# Patient Record
Sex: MALE | Race: WHITE | HISPANIC OR LATINO | URBAN - METROPOLITAN AREA
[De-identification: names, ages, dates, MRNs, and addresses within clinical notes are randomized per-mention and may not be internally consistent; named-entity substitution may affect disease eponyms.]

---

## 2018-04-09 ENCOUNTER — OUTPATIENT (OUTPATIENT)
Dept: OUTPATIENT SERVICES | Facility: HOSPITAL | Age: 58
LOS: 1 days | End: 2018-04-09
Payer: MEDICARE

## 2018-04-09 ENCOUNTER — APPOINTMENT (OUTPATIENT)
Dept: PULMONOLOGY | Facility: CLINIC | Age: 58
End: 2018-04-09
Payer: MEDICARE

## 2018-04-09 PROCEDURE — 99386 PREV VISIT NEW AGE 40-64: CPT | Mod: 25

## 2018-04-09 PROCEDURE — 71046 X-RAY EXAM CHEST 2 VIEWS: CPT | Mod: 26

## 2018-04-09 PROCEDURE — 71046 X-RAY EXAM CHEST 2 VIEWS: CPT

## 2018-04-09 PROCEDURE — 36415 COLL VENOUS BLD VENIPUNCTURE: CPT

## 2018-04-09 PROCEDURE — 93000 ELECTROCARDIOGRAM COMPLETE: CPT

## 2018-04-13 ENCOUNTER — APPOINTMENT (OUTPATIENT)
Dept: ULTRASOUND IMAGING | Facility: HOSPITAL | Age: 58
End: 2018-04-13
Payer: MEDICARE

## 2018-04-13 ENCOUNTER — OUTPATIENT (OUTPATIENT)
Dept: OUTPATIENT SERVICES | Facility: HOSPITAL | Age: 58
LOS: 1 days | End: 2018-04-13
Payer: MEDICARE

## 2018-04-13 PROCEDURE — 93880 EXTRACRANIAL BILAT STUDY: CPT

## 2018-04-13 PROCEDURE — 93880 EXTRACRANIAL BILAT STUDY: CPT | Mod: 26

## 2018-04-17 ENCOUNTER — OUTPATIENT (OUTPATIENT)
Dept: OUTPATIENT SERVICES | Facility: HOSPITAL | Age: 58
LOS: 1 days | End: 2018-04-17
Payer: MEDICARE

## 2018-04-17 DIAGNOSIS — R07.9 CHEST PAIN, UNSPECIFIED: ICD-10-CM

## 2018-04-17 DIAGNOSIS — I25.9 CHRONIC ISCHEMIC HEART DISEASE, UNSPECIFIED: ICD-10-CM

## 2018-04-17 DIAGNOSIS — R06.02 SHORTNESS OF BREATH: ICD-10-CM

## 2018-04-17 PROCEDURE — 93306 TTE W/DOPPLER COMPLETE: CPT | Mod: 26

## 2018-04-17 PROCEDURE — 93306 TTE W/DOPPLER COMPLETE: CPT

## 2018-04-18 ENCOUNTER — APPOINTMENT (OUTPATIENT)
Dept: SLEEP CENTER | Facility: HOSPITAL | Age: 58
End: 2018-04-18

## 2018-04-18 ENCOUNTER — OUTPATIENT (OUTPATIENT)
Dept: OUTPATIENT SERVICES | Facility: HOSPITAL | Age: 58
LOS: 1 days | End: 2018-04-18
Payer: MEDICARE

## 2018-04-18 DIAGNOSIS — G47.33 OBSTRUCTIVE SLEEP APNEA (ADULT) (PEDIATRIC): ICD-10-CM

## 2018-04-18 PROCEDURE — 95811 POLYSOM 6/>YRS CPAP 4/> PARM: CPT | Mod: 26

## 2018-04-18 PROCEDURE — 95811 POLYSOM 6/>YRS CPAP 4/> PARM: CPT

## 2018-04-21 ENCOUNTER — OUTPATIENT (OUTPATIENT)
Dept: OUTPATIENT SERVICES | Facility: HOSPITAL | Age: 58
LOS: 1 days | End: 2018-04-21
Payer: MEDICARE

## 2018-04-21 ENCOUNTER — APPOINTMENT (OUTPATIENT)
Dept: CT IMAGING | Facility: HOSPITAL | Age: 58
End: 2018-04-21
Payer: MEDICARE

## 2018-04-21 PROCEDURE — 71250 CT THORAX DX C-: CPT

## 2018-04-21 PROCEDURE — 71250 CT THORAX DX C-: CPT | Mod: 26

## 2018-05-02 ENCOUNTER — APPOINTMENT (OUTPATIENT)
Dept: HEART AND VASCULAR | Facility: CLINIC | Age: 58
End: 2018-05-02
Payer: MEDICARE

## 2018-05-02 VITALS
BODY MASS INDEX: 31.83 KG/M2 | WEIGHT: 173 LBS | DIASTOLIC BLOOD PRESSURE: 80 MMHG | SYSTOLIC BLOOD PRESSURE: 124 MMHG | HEART RATE: 68 BPM | HEIGHT: 62 IN

## 2018-05-02 DIAGNOSIS — Z82.3 FAMILY HISTORY OF STROKE: ICD-10-CM

## 2018-05-02 PROCEDURE — 93306 TTE W/DOPPLER COMPLETE: CPT

## 2018-05-02 PROCEDURE — 36415 COLL VENOUS BLD VENIPUNCTURE: CPT

## 2018-05-02 PROCEDURE — 93000 ELECTROCARDIOGRAM COMPLETE: CPT

## 2018-05-02 PROCEDURE — 99204 OFFICE O/P NEW MOD 45 MIN: CPT | Mod: 25

## 2018-05-03 ENCOUNTER — FORM ENCOUNTER (OUTPATIENT)
Age: 58
End: 2018-05-03

## 2018-05-03 LAB
ALBUMIN SERPL ELPH-MCNC: 4.5 G/DL
ALP BLD-CCNC: 75 U/L
ALT SERPL-CCNC: 33 U/L
ANION GAP SERPL CALC-SCNC: 15 MMOL/L
AST SERPL-CCNC: 22 U/L
BILIRUB SERPL-MCNC: 0.5 MG/DL
BUN SERPL-MCNC: 15 MG/DL
CALCIUM SERPL-MCNC: 10 MG/DL
CHLORIDE SERPL-SCNC: 98 MMOL/L
CHOLEST SERPL-MCNC: 183 MG/DL
CHOLEST/HDLC SERPL: 4.6 RATIO
CO2 SERPL-SCNC: 28 MMOL/L
CREAT SERPL-MCNC: 1.06 MG/DL
GLUCOSE SERPL-MCNC: 89 MG/DL
HDLC SERPL-MCNC: 40 MG/DL
LDLC SERPL CALC-MCNC: 102 MG/DL
POTASSIUM SERPL-SCNC: 3.8 MMOL/L
PROT SERPL-MCNC: 7.4 G/DL
SODIUM SERPL-SCNC: 141 MMOL/L
TRIGL SERPL-MCNC: 204 MG/DL

## 2018-05-09 ENCOUNTER — RESULT REVIEW (OUTPATIENT)
Age: 58
End: 2018-05-09

## 2018-05-15 ENCOUNTER — APPOINTMENT (OUTPATIENT)
Dept: HEART AND VASCULAR | Facility: CLINIC | Age: 58
End: 2018-05-15
Payer: MEDICARE

## 2018-05-15 VITALS — HEIGHT: 62 IN | BODY MASS INDEX: 31.83 KG/M2 | WEIGHT: 173 LBS

## 2018-05-15 PROCEDURE — A9500: CPT

## 2018-05-15 PROCEDURE — 93015 CV STRESS TEST SUPVJ I&R: CPT

## 2018-05-15 PROCEDURE — 78452 HT MUSCLE IMAGE SPECT MULT: CPT

## 2018-07-02 ENCOUNTER — APPOINTMENT (OUTPATIENT)
Dept: PULMONOLOGY | Facility: CLINIC | Age: 58
End: 2018-07-02
Payer: MEDICARE

## 2018-07-02 VITALS
BODY MASS INDEX: 32.09 KG/M2 | TEMPERATURE: 98 F | HEART RATE: 84 BPM | WEIGHT: 174.4 LBS | OXYGEN SATURATION: 98 % | DIASTOLIC BLOOD PRESSURE: 70 MMHG | HEIGHT: 62 IN | SYSTOLIC BLOOD PRESSURE: 130 MMHG

## 2018-07-02 PROCEDURE — 99214 OFFICE O/P EST MOD 30 MIN: CPT

## 2018-07-18 ENCOUNTER — APPOINTMENT (OUTPATIENT)
Dept: HEART AND VASCULAR | Facility: CLINIC | Age: 58
End: 2018-07-18

## 2018-10-02 ENCOUNTER — APPOINTMENT (OUTPATIENT)
Dept: PULMONOLOGY | Facility: CLINIC | Age: 58
End: 2018-10-02
Payer: MEDICARE

## 2018-10-02 VITALS
SYSTOLIC BLOOD PRESSURE: 130 MMHG | WEIGHT: 175 LBS | BODY MASS INDEX: 32.2 KG/M2 | TEMPERATURE: 98.5 F | HEART RATE: 80 BPM | OXYGEN SATURATION: 98 % | HEIGHT: 62 IN | DIASTOLIC BLOOD PRESSURE: 90 MMHG

## 2018-10-02 DIAGNOSIS — Z23 ENCOUNTER FOR IMMUNIZATION: ICD-10-CM

## 2018-10-02 DIAGNOSIS — G56.00 CARPAL TUNNEL SYNDROME, UNSPECIFIED UPPER LIMB: ICD-10-CM

## 2018-10-02 PROCEDURE — G0008: CPT

## 2018-10-02 PROCEDURE — 99214 OFFICE O/P EST MOD 30 MIN: CPT | Mod: 25

## 2018-10-02 PROCEDURE — 36415 COLL VENOUS BLD VENIPUNCTURE: CPT

## 2018-10-02 PROCEDURE — 90662 IIV NO PRSV INCREASED AG IM: CPT

## 2018-10-02 NOTE — ASSESSMENT
[FreeTextEntry1] : Dyspnea\par \par My concern at this could be related to underlying obstructive lung disease versus cardiac in etiology. The previous CT scan of the chest was unremarkable and also the echocardiogram was consistent with diastolic dysfunction. Patient has an appointment with Dr. Castillo this month. I will follow PFT and further recommendation will follow.\par \par Obstructive sleep apnea\par \par The patient is compliant with the machine and he had to fix a part of the component. Hyperlipidemia\par \par Patient is on statin and followup on the lab\par \par Hypertension\par \par The blood pressure is 120/80 I retook the blood pressure. He is to continue on current meds at night\par \par Inner thigh dysfunction\par \par Patient is on sildenafil as needed\par \par Coronary artery disease and aortic insufficiency\par \par Patient is to followup with Dr. Castillo and he is to continue on the current regimen. Carpal tunnel syndrome\par \par I instructed the patient to follow with orthopedics as they recommended injection and he will follow with them.\par \par Immunization\par \par 0.5 mL of fluid seen was injected into the right it to 8 which tolerated well.

## 2018-10-02 NOTE — HISTORY OF PRESENT ILLNESS
[FreeTextEntry1] : he is sob and coughing [de-identified] : He started feeling sob for some time when he goes up stairs and up a hill but he is going for walks he is OK as long as he is slow.  The cough started again and sounds like asthma.  The cough comes spontaneously when he feels hot.  he is sleeping with the mask.  He has acid reflux. No ruuny nose.  Both hands are numb.  His back hurts

## 2018-10-04 LAB
ALBUMIN SERPL ELPH-MCNC: 4.9 G/DL
ALP BLD-CCNC: 51 U/L
ALT SERPL-CCNC: 20 U/L
ANION GAP SERPL CALC-SCNC: 12 MMOL/L
AST SERPL-CCNC: 19 U/L
BASOPHILS # BLD AUTO: 0.02 K/UL
BASOPHILS NFR BLD AUTO: 0.4 %
BILIRUB SERPL-MCNC: 0.4 MG/DL
BUN SERPL-MCNC: 18 MG/DL
CALCIUM SERPL-MCNC: 10.1 MG/DL
CHLORIDE SERPL-SCNC: 104 MMOL/L
CHOLEST SERPL-MCNC: 181 MG/DL
CHOLEST/HDLC SERPL: 4.6 RATIO
CO2 SERPL-SCNC: 24 MMOL/L
CREAT SERPL-MCNC: 0.91 MG/DL
EOSINOPHIL # BLD AUTO: 0.11 K/UL
EOSINOPHIL NFR BLD AUTO: 2 %
GLUCOSE SERPL-MCNC: 90 MG/DL
HBA1C MFR BLD HPLC: 6.3 %
HCT VFR BLD CALC: 41.5 %
HDLC SERPL-MCNC: 39 MG/DL
HGB BLD-MCNC: 14.3 G/DL
IMM GRANULOCYTES NFR BLD AUTO: 0.2 %
LDLC SERPL CALC-MCNC: 115 MG/DL
LYMPHOCYTES # BLD AUTO: 1.39 K/UL
LYMPHOCYTES NFR BLD AUTO: 25.4 %
MAN DIFF?: NORMAL
MCHC RBC-ENTMCNC: 28.8 PG
MCHC RBC-ENTMCNC: 34.5 GM/DL
MCV RBC AUTO: 83.7 FL
MONOCYTES # BLD AUTO: 0.63 K/UL
MONOCYTES NFR BLD AUTO: 11.5 %
NEUTROPHILS # BLD AUTO: 3.32 K/UL
NEUTROPHILS NFR BLD AUTO: 60.5 %
PLATELET # BLD AUTO: 269 K/UL
POTASSIUM SERPL-SCNC: 3.9 MMOL/L
PROT SERPL-MCNC: 7.4 G/DL
RBC # BLD: 4.96 M/UL
RBC # FLD: 14.5 %
SODIUM SERPL-SCNC: 140 MMOL/L
T3 SERPL-MCNC: 122 NG/DL
T4 SERPL-MCNC: 6.9 UG/DL
TRIGL SERPL-MCNC: 137 MG/DL
TSH SERPL-ACNC: 0.7 UIU/ML
WBC # FLD AUTO: 5.48 K/UL

## 2018-10-16 ENCOUNTER — NON-APPOINTMENT (OUTPATIENT)
Age: 58
End: 2018-10-16

## 2018-10-16 ENCOUNTER — APPOINTMENT (OUTPATIENT)
Dept: HEART AND VASCULAR | Facility: CLINIC | Age: 58
End: 2018-10-16
Payer: MEDICARE

## 2018-10-16 VITALS
HEIGHT: 62 IN | DIASTOLIC BLOOD PRESSURE: 90 MMHG | WEIGHT: 175 LBS | BODY MASS INDEX: 32.2 KG/M2 | SYSTOLIC BLOOD PRESSURE: 138 MMHG

## 2018-10-16 VITALS — DIASTOLIC BLOOD PRESSURE: 90 MMHG | SYSTOLIC BLOOD PRESSURE: 150 MMHG

## 2018-10-16 VITALS — HEART RATE: 75 BPM

## 2018-10-16 PROCEDURE — 93000 ELECTROCARDIOGRAM COMPLETE: CPT

## 2018-10-16 PROCEDURE — 99214 OFFICE O/P EST MOD 30 MIN: CPT | Mod: 25

## 2018-10-16 RX ORDER — CHLORTHALIDONE 25 MG/1
25 TABLET ORAL
Refills: 0 | Status: DISCONTINUED | COMMUNITY
End: 2018-10-16

## 2018-10-16 RX ORDER — AMLODIPINE BESYLATE 10 MG/1
10 TABLET ORAL
Refills: 0 | Status: DISCONTINUED | COMMUNITY
End: 2018-10-16

## 2018-10-17 LAB — NT-PROBNP SERPL-MCNC: 92 PG/ML

## 2018-11-13 ENCOUNTER — APPOINTMENT (OUTPATIENT)
Dept: HEART AND VASCULAR | Facility: CLINIC | Age: 58
End: 2018-11-13

## 2018-11-16 ENCOUNTER — APPOINTMENT (OUTPATIENT)
Dept: PULMONOLOGY | Facility: CLINIC | Age: 58
End: 2018-11-16

## 2018-11-20 ENCOUNTER — RX RENEWAL (OUTPATIENT)
Age: 58
End: 2018-11-20

## 2018-11-27 ENCOUNTER — APPOINTMENT (OUTPATIENT)
Dept: PULMONOLOGY | Facility: CLINIC | Age: 58
End: 2018-11-27
Payer: MEDICARE

## 2018-12-05 ENCOUNTER — APPOINTMENT (OUTPATIENT)
Dept: PULMONOLOGY | Facility: CLINIC | Age: 58
End: 2018-12-05

## 2018-12-14 ENCOUNTER — APPOINTMENT (OUTPATIENT)
Dept: PULMONOLOGY | Facility: CLINIC | Age: 58
End: 2018-12-14
Payer: MEDICARE

## 2018-12-14 PROCEDURE — 94729 DIFFUSING CAPACITY: CPT

## 2018-12-14 PROCEDURE — 94060 EVALUATION OF WHEEZING: CPT

## 2019-02-15 RX ORDER — ASPIRIN 81 MG/1
81 TABLET ORAL DAILY
Qty: 30 | Refills: 0 | Status: ACTIVE | COMMUNITY
Start: 1900-01-01 | End: 1900-01-01

## 2019-02-25 ENCOUNTER — APPOINTMENT (OUTPATIENT)
Dept: HEART AND VASCULAR | Facility: CLINIC | Age: 59
End: 2019-02-25

## 2019-04-10 ENCOUNTER — APPOINTMENT (OUTPATIENT)
Dept: PULMONOLOGY | Facility: CLINIC | Age: 59
End: 2019-04-10

## 2019-08-29 ENCOUNTER — APPOINTMENT (OUTPATIENT)
Dept: PULMONOLOGY | Facility: CLINIC | Age: 59
End: 2019-08-29
Payer: MEDICARE

## 2019-08-29 VITALS
OXYGEN SATURATION: 100 % | HEIGHT: 62 IN | BODY MASS INDEX: 33.13 KG/M2 | WEIGHT: 180 LBS | DIASTOLIC BLOOD PRESSURE: 104 MMHG | TEMPERATURE: 98.7 F | RESPIRATION RATE: 12 BRPM | HEART RATE: 84 BPM | SYSTOLIC BLOOD PRESSURE: 176 MMHG

## 2019-08-29 PROCEDURE — 94727 GAS DIL/WSHOT DETER LNG VOL: CPT

## 2019-08-29 PROCEDURE — 94060 EVALUATION OF WHEEZING: CPT

## 2019-08-29 PROCEDURE — 99203 OFFICE O/P NEW LOW 30 MIN: CPT | Mod: 25

## 2019-08-29 PROCEDURE — 94729 DIFFUSING CAPACITY: CPT

## 2019-08-29 NOTE — DISCUSSION/SUMMARY
[FreeTextEntry1] : 58 yo male with complaints consistent with airway inflammation. Montelukast 10 mg daily prescribed with PRN albuterol MDI. Treatment adjustment will depend on symptomatic needs. The etiology of the mild restriction is likely due to his prior CABG. PFT will be repeated in the future.\par Treatment for JANES was stressed. CPAP will be ordered. Diet and and weight loss discussed. He is to avoid sedative/hypnotic meds and excessive alcohol use.

## 2019-08-29 NOTE — HISTORY OF PRESENT ILLNESS
[FreeTextEntry1] : 58 yo male with hx of chronic PRN productive cough presents for evaluation. The patient denies fever, chest pain or hemoptysis. He has been treated in the past with albuterol MDI which he uses PRN. He has grass allergies.The patient also has a hx of JANES, but no longer uses CPAP because of insurance, which has now changed. He complains of daytime somnolence and fatigue.

## 2019-08-29 NOTE — REVIEW OF SYSTEMS
[As Noted in HPI] : as noted in HPI [Cough] : cough [Sputum] : sputum  [Hypertension] : ~T hypertension [Negative] : Pulmonary Hypertension [Fatigue] : fatigue [Snoring] : snoring [Hypersomnolence] : sleeping much more than usual

## 2019-08-29 NOTE — PHYSICAL EXAM
[General Appearance - Well Developed] : well developed [Normal Appearance] : normal appearance [Well Groomed] : well groomed [General Appearance - Well Nourished] : well nourished [No Deformities] : no deformities [General Appearance - In No Acute Distress] : no acute distress [Normal Conjunctiva] : the conjunctiva exhibited no abnormalities [Eyelids - No Xanthelasma] : the eyelids demonstrated no xanthelasmas [Normal Oropharynx] : normal oropharynx [Neck Appearance] : the appearance of the neck was normal [Neck Cervical Mass (___cm)] : no neck mass was observed [Jugular Venous Distention Increased] : there was no jugular-venous distention [Thyroid Diffuse Enlargement] : the thyroid was not enlarged [Thyroid Nodule] : there were no palpable thyroid nodules [Heart Rate And Rhythm] : heart rate and rhythm were normal [Heart Sounds] : normal S1 and S2 [Murmurs] : no murmurs present [Respiration, Rhythm And Depth] : normal respiratory rhythm and effort [Exaggerated Use Of Accessory Muscles For Inspiration] : no accessory muscle use [Auscultation Breath Sounds / Voice Sounds] : lungs were clear to auscultation bilaterally [Abdomen Soft] : soft [Abdomen Tenderness] : non-tender [Abdomen Mass (___ Cm)] : no abdominal mass palpated [Cyanosis, Localized] : no localized cyanosis [Nail Clubbing] : no clubbing of the fingernails [Petechial Hemorrhages (___cm)] : no petechial hemorrhages [Skin Color & Pigmentation] : normal skin color and pigmentation [Skin Turgor] : normal skin turgor [] : no rash [No Focal Deficits] : no focal deficits [Oriented To Time, Place, And Person] : oriented to person, place, and time [Impaired Insight] : insight and judgment were intact [Affect] : the affect was normal

## 2019-10-09 ENCOUNTER — APPOINTMENT (OUTPATIENT)
Dept: PULMONOLOGY | Facility: CLINIC | Age: 59
End: 2019-10-09

## 2019-11-21 ENCOUNTER — APPOINTMENT (OUTPATIENT)
Dept: PULMONOLOGY | Facility: CLINIC | Age: 59
End: 2019-11-21
Payer: MEDICARE

## 2019-11-21 VITALS
OXYGEN SATURATION: 99 % | WEIGHT: 175 LBS | DIASTOLIC BLOOD PRESSURE: 102 MMHG | HEART RATE: 80 BPM | BODY MASS INDEX: 32.01 KG/M2 | SYSTOLIC BLOOD PRESSURE: 176 MMHG

## 2019-11-21 PROCEDURE — 90686 IIV4 VACC NO PRSV 0.5 ML IM: CPT

## 2019-11-21 PROCEDURE — G0008: CPT

## 2019-11-21 PROCEDURE — 99214 OFFICE O/P EST MOD 30 MIN: CPT | Mod: 25

## 2019-11-29 NOTE — REVIEW OF SYSTEMS
[Fatigue] : fatigue [As Noted in HPI] : as noted in HPI [Sputum] : not coughing up ~M sputum [Dyspnea] : no dyspnea [Cough] : no cough [Hypertension] : ~T hypertension [Hypersomnolence] : not sleeping much more than usual [Snoring] : no snoring [Negative] : Pulmonary Hypertension

## 2019-11-29 NOTE — PHYSICAL EXAM
[General Appearance - Well Developed] : well developed [Normal Appearance] : normal appearance [Well Groomed] : well groomed [No Deformities] : no deformities [General Appearance - In No Acute Distress] : no acute distress [General Appearance - Well Nourished] : well nourished [Normal Oropharynx] : normal oropharynx [Normal Conjunctiva] : the conjunctiva exhibited no abnormalities [Eyelids - No Xanthelasma] : the eyelids demonstrated no xanthelasmas [Neck Cervical Mass (___cm)] : no neck mass was observed [Neck Appearance] : the appearance of the neck was normal [Thyroid Diffuse Enlargement] : the thyroid was not enlarged [Heart Rate And Rhythm] : heart rate and rhythm were normal [Thyroid Nodule] : there were no palpable thyroid nodules [Jugular Venous Distention Increased] : there was no jugular-venous distention [FreeTextEntry1] : Mid sternotomy scar [Heart Sounds] : normal S1 and S2 [Murmurs] : no murmurs present [Exaggerated Use Of Accessory Muscles For Inspiration] : no accessory muscle use [Respiration, Rhythm And Depth] : normal respiratory rhythm and effort [Auscultation Breath Sounds / Voice Sounds] : lungs were clear to auscultation bilaterally [Abdomen Soft] : soft [Abdomen Tenderness] : non-tender [Nail Clubbing] : no clubbing of the fingernails [Abdomen Mass (___ Cm)] : no abdominal mass palpated [Petechial Hemorrhages (___cm)] : no petechial hemorrhages [Cyanosis, Localized] : no localized cyanosis [] : no rash [No Venous Stasis] : no venous stasis [Skin Color & Pigmentation] : normal skin color and pigmentation [No Xanthoma] : no  xanthoma was observed [No Skin Ulcers] : no skin ulcer [Skin Lesions] : no skin lesions [Impaired Insight] : insight and judgment were intact [Oriented To Time, Place, And Person] : oriented to person, place, and time [Affect] : the affect was normal [No Focal Deficits] : no focal deficits

## 2019-11-29 NOTE — HISTORY OF PRESENT ILLNESS
[FreeTextEntry1] : 58 yo male with hx of JANES and OAD presents for follow up. The patient is compliant with CPAP use. He complains of PRN fatigue but denies other sleep related complaints.He uses montelukast daily with PRN albuterol MDI. He denies cough, chest pain or hemoptysis.

## 2019-11-29 NOTE — DISCUSSION/SUMMARY
[FreeTextEntry1] : 58 yo male with stable pulmonary exam. He is to use montelukast daily as before with PRN albuterol MDI. Treatment adjustment will depend on symptomatic needs.PFT will be performed next visit. Continued compliance with CPAP use stressed. The influenza vaccine was administered.

## 2020-02-27 ENCOUNTER — APPOINTMENT (OUTPATIENT)
Dept: PULMONOLOGY | Facility: CLINIC | Age: 60
End: 2020-02-27

## 2020-04-16 ENCOUNTER — APPOINTMENT (OUTPATIENT)
Dept: PULMONOLOGY | Facility: CLINIC | Age: 60
End: 2020-04-16

## 2020-04-24 ENCOUNTER — TRANSCRIPTION ENCOUNTER (OUTPATIENT)
Age: 60
End: 2020-04-24

## 2020-04-24 ENCOUNTER — APPOINTMENT (OUTPATIENT)
Dept: PULMONOLOGY | Facility: CLINIC | Age: 60
End: 2020-04-24
Payer: MEDICARE

## 2020-04-24 PROCEDURE — 99214 OFFICE O/P EST MOD 30 MIN: CPT | Mod: 95

## 2020-04-24 RX ORDER — RANITIDINE 150 MG/1
150 TABLET ORAL
Qty: 90 | Refills: 3 | Status: DISCONTINUED | COMMUNITY
Start: 2018-05-08 | End: 2020-04-24

## 2020-04-26 ENCOUNTER — APPOINTMENT (OUTPATIENT)
Dept: HEART AND VASCULAR | Facility: CLINIC | Age: 60
End: 2020-04-26

## 2020-04-27 ENCOUNTER — APPOINTMENT (OUTPATIENT)
Dept: HEART AND VASCULAR | Facility: CLINIC | Age: 60
End: 2020-04-27
Payer: MEDICARE

## 2020-04-27 ENCOUNTER — APPOINTMENT (OUTPATIENT)
Dept: HEART AND VASCULAR | Facility: CLINIC | Age: 60
End: 2020-04-27

## 2020-04-27 VITALS — WEIGHT: 185 LBS | BODY MASS INDEX: 33.84 KG/M2

## 2020-04-27 VITALS — HEART RATE: 88 BPM

## 2020-04-27 DIAGNOSIS — Z95.0 PRESENCE OF CARDIAC PACEMAKER: ICD-10-CM

## 2020-04-27 PROCEDURE — 99214 OFFICE O/P EST MOD 30 MIN: CPT | Mod: 95

## 2020-04-27 RX ORDER — EZETIMIBE 10 MG/1
10 TABLET ORAL
Qty: 90 | Refills: 2 | Status: DISCONTINUED | COMMUNITY
End: 2020-04-27

## 2020-04-27 RX ORDER — VALSARTAN AND HYDROCHLOROTHIAZIDE 320; 25 MG/1; MG/1
320-25 TABLET, FILM COATED ORAL DAILY
Qty: 30 | Refills: 1 | Status: DISCONTINUED | COMMUNITY
Start: 1900-01-01 | End: 2020-04-27

## 2020-04-27 RX ORDER — EPLERENONE 50 MG/1
50 TABLET, COATED ORAL DAILY
Qty: 1 | Refills: 0 | Status: DISCONTINUED | COMMUNITY
End: 2020-04-27

## 2020-04-27 RX ORDER — INFLUENZA A VIRUS A/CALIFORNIA/7/2009 X-179A (H1N1) ANTIGEN (FORMALDEHYDE INACTIVATED), INFLUENZA A VIRUS A/HONG KONG/4801/2014 X-263B (H3N2) ANTIGEN (FORMALDEHYDE INACTIVATED), AND INFLUENZA B VIRUS B/BRISBANE/60/2008 ANTIGEN (FORMALDEHYDE INACTIVATED) 60; 60; 60 UG/.5ML; UG/.5ML; UG/.5ML
INJECTION, SUSPENSION INTRAMUSCULAR
Qty: 1 | Refills: 0 | Status: DISCONTINUED | COMMUNITY
Start: 2018-10-02 | End: 2020-04-27

## 2020-04-27 NOTE — HISTORY OF PRESENT ILLNESS
[Feelings Of Weakness On Exertion] : stable exercise intolerance [Difficulty Breathing During Exertion] : stable dyspnea on exertion [Regional Soft Tissue Swelling Both Lower Extremities] : improved lower extremity edema [Wheezing] : improved wheezing [Cough] : stable coughing [Chest Pain Or Discomfort] : improved chest pain [Fever] : improved fever [5  -  Severe] : 5, severe [Date: ___] : was performed [unfilled] [None] : None [Obstructive Sleep Apnea] : obstructive sleep apnea [Stable] : are stable [TextBox_4] : 60 y with PMH of JANES with nasal mask (compliant with CPAP), Pacemaker ([facility] following).  Pt presents today for a televideo visit.\par \par He has complaints of intermittent cough.  He has SOB with rushing.  He walks his dog several time a day with dog.  denies fever, wheezing, baronial pain, N& V, chest pain. \par \par BP 140s over 90.  Pt BP taking during apt with home machine 162/123 and error message. Denies headaches or dizziness.  He has a wrist machine and states it has been acting up.  He has not yet taken his BP medications this morning.  He has SOB and has been more complaint with the CPAP mask.\par \par Pt is followed by pain management for back and knee pain.  He was given tramadol 50 mg  as needed.  He is following Dr. osuna. [Wt Gain ___ Lbs] : no recent weight gain [Wt Loss ___ Lbs] : no recent weight loss [Oxygen] : the patient uses no supplemental oxygen [Snoring] : no snoring [Witnessed Apneas] : no witnessed sleep apnea [Frequent Nocturnal Awakening] : no nocturnal awakening [Daytime Somnolence] : no daytime somnolence [Unintentional Sleep while Active] : no unintentional sleep while active [Awakes Unrefreshed] : does not awake unrefreshed [Awakes with Headache] : no headache upon awakening [Awakening With Dry Mouth] : no dry mouth upon awakening

## 2020-04-27 NOTE — ASSESSMENT
[FreeTextEntry1] : The patient was compliant with the CPAP mask.  He has a AHI 34.6. He tolerated the CPAP mask and compliant with it. \par \par Coronary disease - follow with labs during next visit in office\par \par Hypertension - Pt instructed to take BP medications and follow BP.  He is instructed to get a new home cuff due to error messages.  He will follow with Dr. Castillo\par \par Hyperlipidemia - follow lab work next visit in office \par \par Restrictive lung disease - Patient is clinically stable.\par \par Health Maintenance:   He is to increase her exercise capacity. I renewed his mediations. \par I prescribed Cialis for his erectile dysfunction.  It was previously cleared with Dr. Pan.\par Renewed meds and will have telehealth visit with Dr. Castillo

## 2020-04-27 NOTE — DISCUSSION/SUMMARY
[FreeTextEntry1] : BP elevated as per pt( states HR in 80's)\par  restart toprol (for CAD & HPTN) and continue Diovan + norvasc for HPTN; f/u with EP for ppm , cont Lipitor for lipids; he is still on Plavix- will get f/u nuc stress and if no ischemia feel can DC Plavix and cont asa. F/u with pulmonary for JANES. RV 1-2 weeks for BP check and echo for MONK( chronic) I feel MONK due to weight/pulmonary_ no sig ischemia on prior nuc stress\par will also draw labs. If BP still high would change HCTZ to chlorthalidone\par Telehealth visit done on [employer]- multiple attempts yesterday on [employer] and technical issues.\par Time spent included review of charta(last seen Oct 2018) and attempts today and yesterday

## 2020-04-27 NOTE — REVIEW OF SYSTEMS
[Fever] : no fever [Headache] : no headache [Chills] : no chills [Recent Weight Gain (___ Lbs)] : recent [unfilled] ~Ulb weight gain [Feeling Fatigued] : not feeling fatigued [Blurry Vision] : no blurred vision [Seeing Double (Diplopia)] : no diplopia [see HPI] : see HPI [Eyeglasses] : not currently wearing eyeglasses [Dyspnea on exertion] : dyspnea during exertion [Chest  Pressure] : no chest pressure [Lower Ext Edema] : no extremity edema [Chest Pain] : no chest pain [Leg Claudication] : no intermittent leg claudication [Palpitations] : no palpitations [Cough] : cough [Wheezing] : no wheezing [Coughing Up Blood] : no hemoptysis [Abdominal Pain] : no abdominal pain [Nausea] : no nausea [Vomiting] : no vomiting [Heartburn] : heartburn [Change in Appetite] : no change in appetite [Change In The Stool] : no change in stool [Dysphagia] : no dysphagia [Hematuria] : no hematuria [Joint Pain] : joint pain [Nocturia] : nocturia [Wrist Pain] : wrist pain [Wrist Stiffness] : stiffness of the wrist [Lower Back Pain] : lower back pain [Dizziness] : dizziness [Tremor] : no tremor was seen [Numbness (Hypesthesia)] : no numbness [Convulsions] : no convulsions [Confusion] : no confusion was observed [Tingling (Paresthesia)] : no tingling [Depression] : no depression [Anxiety] : anxiety [Under Stress] : not under stress [Suicidal] : not suicidal [Negative] : Heme/Lymph

## 2020-04-27 NOTE — HISTORY OF PRESENT ILLNESS
[FreeTextEntry1] : referred by BHARAT Rogers- his BP has been elevated- using wrist cuff- has his chronic MONK- no cp,sob- he stopped (when?) toprol + Zetia. no edema, PND,orthopnea,palpitations. Using CPAP

## 2020-04-27 NOTE — REASON FOR VISIT
[Home] : at home, [unfilled] , at the time of the visit. [Medical Office: ([facility])___] : at the medical office located in  [Patient] : the patient [FreeTextEntry1] : BP f/u

## 2020-05-14 ENCOUNTER — APPOINTMENT (OUTPATIENT)
Dept: HEART AND VASCULAR | Facility: CLINIC | Age: 60
End: 2020-05-14
Payer: MEDICARE

## 2020-05-14 ENCOUNTER — NON-APPOINTMENT (OUTPATIENT)
Age: 60
End: 2020-05-14

## 2020-05-14 VITALS
BODY MASS INDEX: 32.2 KG/M2 | SYSTOLIC BLOOD PRESSURE: 148 MMHG | WEIGHT: 175 LBS | DIASTOLIC BLOOD PRESSURE: 100 MMHG | HEART RATE: 77 BPM | OXYGEN SATURATION: 98 % | HEIGHT: 62 IN

## 2020-05-14 VITALS — TEMPERATURE: 97.7 F

## 2020-05-14 VITALS — DIASTOLIC BLOOD PRESSURE: 92 MMHG | SYSTOLIC BLOOD PRESSURE: 150 MMHG

## 2020-05-14 PROCEDURE — 93000 ELECTROCARDIOGRAM COMPLETE: CPT

## 2020-05-14 PROCEDURE — 99214 OFFICE O/P EST MOD 30 MIN: CPT | Mod: 25

## 2020-05-14 PROCEDURE — 93306 TTE W/DOPPLER COMPLETE: CPT

## 2020-05-14 NOTE — PHYSICAL EXAM
[Normal Appearance] : normal appearance [General Appearance - Well Developed] : well developed [General Appearance - Well Nourished] : well nourished [Well Groomed] : well groomed [General Appearance - In No Acute Distress] : no acute distress [No Deformities] : no deformities [Normal Oral Mucosa] : normal oral mucosa [Normal Conjunctiva] : the conjunctiva exhibited no abnormalities [Normal Jugular Venous A Waves Present] : normal jugular venous A waves present [Normal Jugular Venous V Waves Present] : normal jugular venous V waves present [No Jugular Venous Powell A Waves] : no jugular venous powell A waves [] : no respiratory distress [Respiration, Rhythm And Depth] : normal respiratory rhythm and effort [Exaggerated Use Of Accessory Muscles For Inspiration] : no accessory muscle use [Auscultation Breath Sounds / Voice Sounds] : lungs were clear to auscultation bilaterally [Heart Sounds] : normal S1 and S2 [Heart Rate And Rhythm] : heart rate and rhythm were normal [Murmurs] : no murmurs present [Edema] : no peripheral edema present [Bowel Sounds] : normal bowel sounds [Abdomen Soft] : soft [Abdomen Tenderness] : non-tender [Abnormal Walk] : normal gait [Nail Clubbing] : no clubbing of the fingernails [Skin Color & Pigmentation] : normal skin color and pigmentation [Oriented To Time, Place, And Person] : oriented to person, place, and time [FreeTextEntry1] : carotids w/o bruits

## 2020-05-14 NOTE — HISTORY OF PRESENT ILLNESS
[FreeTextEntry1] : states BP has been high lately- also for past 1+ month gets 1-2 flight MONK. no cp,pnd,edema,palpitations

## 2020-05-14 NOTE — DISCUSSION/SUMMARY
[FreeTextEntry1] : EKG:NSR,LVH, T wave abn( mild changes as compared to 2018)\par ECHO:EF approx 50%(no change 2018)- moderate AR,LVH\par will get nuc stress for MONK/EKG abn\par continue plavix + asa +toprol for CAD; lipitor for lipids; continue norvasc +diovan and increase chlorthalidone. will get cardiac MR to evaluate AR/EF

## 2020-05-15 LAB
25(OH)D3 SERPL-MCNC: 35.5 NG/ML
ALBUMIN SERPL ELPH-MCNC: 4.8 G/DL
ALP BLD-CCNC: 104 U/L
ALT SERPL-CCNC: 38 U/L
ANION GAP SERPL CALC-SCNC: 19 MMOL/L
AST SERPL-CCNC: 22 U/L
BASOPHILS # BLD AUTO: 0.02 K/UL
BASOPHILS NFR BLD AUTO: 0.2 %
BILIRUB SERPL-MCNC: 0.8 MG/DL
BUN SERPL-MCNC: 19 MG/DL
CALCIUM SERPL-MCNC: 9.9 MG/DL
CHLORIDE SERPL-SCNC: 99 MMOL/L
CHOLEST SERPL-MCNC: 207 MG/DL
CHOLEST/HDLC SERPL: 5.2 RATIO
CO2 SERPL-SCNC: 26 MMOL/L
CREAT SERPL-MCNC: 0.92 MG/DL
EOSINOPHIL # BLD AUTO: 0.17 K/UL
EOSINOPHIL NFR BLD AUTO: 1.8 %
GLUCOSE SERPL-MCNC: 98 MG/DL
HCT VFR BLD CALC: 47.8 %
HDLC SERPL-MCNC: 40 MG/DL
HGB BLD-MCNC: 16 G/DL
IMM GRANULOCYTES NFR BLD AUTO: 0.3 %
LDLC SERPL CALC-MCNC: 127 MG/DL
LYMPHOCYTES # BLD AUTO: 2.19 K/UL
LYMPHOCYTES NFR BLD AUTO: 23.6 %
MAN DIFF?: NORMAL
MCHC RBC-ENTMCNC: 27.9 PG
MCHC RBC-ENTMCNC: 33.5 GM/DL
MCV RBC AUTO: 83.3 FL
MONOCYTES # BLD AUTO: 0.81 K/UL
MONOCYTES NFR BLD AUTO: 8.7 %
NEUTROPHILS # BLD AUTO: 6.05 K/UL
NEUTROPHILS NFR BLD AUTO: 65.4 %
NT-PROBNP SERPL-MCNC: 70 PG/ML
PLATELET # BLD AUTO: 351 K/UL
POTASSIUM SERPL-SCNC: 3.8 MMOL/L
PROT SERPL-MCNC: 7.6 G/DL
PSA SERPL-MCNC: 0.94 NG/ML
RBC # BLD: 5.74 M/UL
RBC # FLD: 13.7 %
SODIUM SERPL-SCNC: 143 MMOL/L
T3 SERPL-MCNC: 93 NG/DL
T4 SERPL-MCNC: 8 UG/DL
TRIGL SERPL-MCNC: 199 MG/DL
TSH SERPL-ACNC: 0.73 UIU/ML
WBC # FLD AUTO: 9.27 K/UL

## 2020-07-22 ENCOUNTER — FORM ENCOUNTER (OUTPATIENT)
Age: 60
End: 2020-07-22

## 2020-07-23 ENCOUNTER — OUTPATIENT (OUTPATIENT)
Dept: OUTPATIENT SERVICES | Facility: HOSPITAL | Age: 60
LOS: 1 days | End: 2020-07-23
Payer: MEDICARE

## 2020-07-23 DIAGNOSIS — R06.00 DYSPNEA, UNSPECIFIED: ICD-10-CM

## 2020-07-23 DIAGNOSIS — I25.10 ATHEROSCLEROTIC HEART DISEASE OF NATIVE CORONARY ARTERY WITHOUT ANGINA PECTORIS: ICD-10-CM

## 2020-07-23 PROCEDURE — 93017 CV STRESS TEST TRACING ONLY: CPT

## 2020-07-23 PROCEDURE — 93016 CV STRESS TEST SUPVJ ONLY: CPT

## 2020-07-23 PROCEDURE — 78452 HT MUSCLE IMAGE SPECT MULT: CPT | Mod: 26

## 2020-07-23 PROCEDURE — 93018 CV STRESS TEST I&R ONLY: CPT

## 2020-07-23 PROCEDURE — A9505: CPT

## 2020-07-23 PROCEDURE — A9500: CPT

## 2020-07-23 PROCEDURE — 78452 HT MUSCLE IMAGE SPECT MULT: CPT

## 2020-08-13 ENCOUNTER — APPOINTMENT (OUTPATIENT)
Dept: HEART AND VASCULAR | Facility: CLINIC | Age: 60
End: 2020-08-13
Payer: MEDICARE

## 2020-08-13 ENCOUNTER — NON-APPOINTMENT (OUTPATIENT)
Age: 60
End: 2020-08-13

## 2020-08-13 VITALS
DIASTOLIC BLOOD PRESSURE: 94 MMHG | WEIGHT: 181 LBS | BODY MASS INDEX: 33.31 KG/M2 | HEART RATE: 65 BPM | HEIGHT: 62 IN | SYSTOLIC BLOOD PRESSURE: 150 MMHG | TEMPERATURE: 97.5 F

## 2020-08-13 VITALS — DIASTOLIC BLOOD PRESSURE: 82 MMHG | SYSTOLIC BLOOD PRESSURE: 124 MMHG

## 2020-08-13 DIAGNOSIS — Z95.1 PRESENCE OF AORTOCORONARY BYPASS GRAFT: ICD-10-CM

## 2020-08-13 PROCEDURE — 99214 OFFICE O/P EST MOD 30 MIN: CPT | Mod: 25

## 2020-08-13 PROCEDURE — 93000 ELECTROCARDIOGRAM COMPLETE: CPT

## 2020-08-13 PROCEDURE — 36415 COLL VENOUS BLD VENIPUNCTURE: CPT

## 2020-08-13 NOTE — DISCUSSION/SUMMARY
[FreeTextEntry1] : EKG:NSR,ST abn\par reviewed labs- CWV=284vy% but wasn't taking statin regularly- will repeat labs and if LDL still not at goal(<70mg%)- would add/change to repatha for better lipid control. repeat bP better\par cont norvasc + Diovan + chlorthalidone for HPTN, asa + Plavix + toprol for CAD- feel duque is pulmonary (proBNP normal)

## 2020-08-13 NOTE — REVIEW OF SYSTEMS
[Dyspnea on exertion] : dyspnea during exertion [see HPI] : see HPI [Heartburn] : heartburn [Nocturia] : nocturia [Wrist Pain] : wrist pain [Joint Pain] : joint pain [Wrist Stiffness] : stiffness of the wrist [Lower Back Pain] : lower back pain [Numbness (Hypesthesia)] : numbness [Anxiety] : anxiety [Under Stress] : under stress [Negative] : Heme/Lymph [Fever] : no fever [Headache] : no headache [Recent Weight Gain (___ Lbs)] : no recent weight gain [Chills] : no chills [Recent Weight Loss (___ Lbs)] : no recent weight loss [Feeling Fatigued] : not feeling fatigued [Blurry Vision] : no blurred vision [Seeing Double (Diplopia)] : no diplopia [Chest  Pressure] : no chest pressure [Eyeglasses] : not currently wearing eyeglasses [Chest Pain] : no chest pain [Lower Ext Edema] : no extremity edema [Leg Claudication] : no intermittent leg claudication [Palpitations] : no palpitations [Cough] : no cough [Wheezing] : no wheezing [Coughing Up Blood] : no hemoptysis [Abdominal Pain] : no abdominal pain [Vomiting] : no vomiting [Nausea] : no nausea [Dysphagia] : no dysphagia [Change in Appetite] : no change in appetite [Change In The Stool] : no change in stool [Hematuria] : no hematuria [Tremor] : no tremor was seen [Dizziness] : no dizziness [Confusion] : no confusion was observed [Tingling (Paresthesia)] : no tingling [Convulsions] : no convulsions [Depression] : no depression [Suicidal] : not suicidal

## 2020-08-13 NOTE — HISTORY OF PRESENT ILLNESS
[FreeTextEntry1] : no cp- taking lipitor regularly. only gets OOB with extreme exertion(especially when wearing face mask)

## 2020-08-13 NOTE — PHYSICAL EXAM
[Normal Appearance] : normal appearance [General Appearance - Well Developed] : well developed [No Deformities] : no deformities [Well Groomed] : well groomed [General Appearance - Well Nourished] : well nourished [Normal Conjunctiva] : the conjunctiva exhibited no abnormalities [General Appearance - In No Acute Distress] : no acute distress [Normal Oral Mucosa] : normal oral mucosa [Normal Jugular Venous A Waves Present] : normal jugular venous A waves present [Normal Jugular Venous V Waves Present] : normal jugular venous V waves present [No Jugular Venous Powell A Waves] : no jugular venous powell A waves [] : no respiratory distress [Respiration, Rhythm And Depth] : normal respiratory rhythm and effort [Auscultation Breath Sounds / Voice Sounds] : lungs were clear to auscultation bilaterally [Exaggerated Use Of Accessory Muscles For Inspiration] : no accessory muscle use [Heart Rate And Rhythm] : heart rate and rhythm were normal [Heart Sounds] : normal S1 and S2 [Murmurs] : no murmurs present [Edema] : no peripheral edema present [Abdomen Soft] : soft [Bowel Sounds] : normal bowel sounds [Abdomen Tenderness] : non-tender [Abnormal Walk] : normal gait [Nail Clubbing] : no clubbing of the fingernails [Skin Color & Pigmentation] : normal skin color and pigmentation [Oriented To Time, Place, And Person] : oriented to person, place, and time [FreeTextEntry1] : carotids w/o bruits

## 2020-08-18 ENCOUNTER — OUTPATIENT (OUTPATIENT)
Dept: OUTPATIENT SERVICES | Facility: HOSPITAL | Age: 60
LOS: 1 days | End: 2020-08-18
Payer: MEDICARE

## 2020-08-18 ENCOUNTER — APPOINTMENT (OUTPATIENT)
Dept: PULMONOLOGY | Facility: CLINIC | Age: 60
End: 2020-08-18
Payer: MEDICARE

## 2020-08-18 VITALS
HEIGHT: 62 IN | RESPIRATION RATE: 12 BRPM | TEMPERATURE: 98.2 F | BODY MASS INDEX: 33.13 KG/M2 | SYSTOLIC BLOOD PRESSURE: 120 MMHG | WEIGHT: 180 LBS | HEART RATE: 79 BPM | OXYGEN SATURATION: 98 % | DIASTOLIC BLOOD PRESSURE: 80 MMHG

## 2020-08-18 LAB
ALBUMIN SERPL ELPH-MCNC: 5.1 G/DL
ALP BLD-CCNC: 74 U/L
ALT SERPL-CCNC: 34 U/L
ANION GAP SERPL CALC-SCNC: 18 MMOL/L
AST SERPL-CCNC: 21 U/L
BILIRUB SERPL-MCNC: 0.5 MG/DL
BUN SERPL-MCNC: 17 MG/DL
CALCIUM SERPL-MCNC: 10.6 MG/DL
CHLORIDE SERPL-SCNC: 96 MMOL/L
CHOLEST SERPL-MCNC: 186 MG/DL
CHOLEST/HDLC SERPL: 5 RATIO
CO2 SERPL-SCNC: 27 MMOL/L
CREAT SERPL-MCNC: 0.87 MG/DL
GLUCOSE SERPL-MCNC: 149 MG/DL
HDLC SERPL-MCNC: 37 MG/DL
LDLC SERPL CALC-MCNC: 109 MG/DL
POTASSIUM SERPL-SCNC: 3.4 MMOL/L
PROT SERPL-MCNC: 7.9 G/DL
SODIUM SERPL-SCNC: 142 MMOL/L
TRIGL SERPL-MCNC: 195 MG/DL

## 2020-08-18 PROCEDURE — 71046 X-RAY EXAM CHEST 2 VIEWS: CPT

## 2020-08-18 PROCEDURE — 71046 X-RAY EXAM CHEST 2 VIEWS: CPT | Mod: 26

## 2020-08-18 PROCEDURE — 99214 OFFICE O/P EST MOD 30 MIN: CPT

## 2020-08-18 NOTE — END OF VISIT
[Time Spent: ___ minutes] : I have spent [unfilled] minutes of time on the encounter. [>50% of the face to face encounter time was spent on counseling and/or coordination of care for ___] : Greater than 50% of the face to face encounter time was spent on counseling and/or coordination of care for [unfilled] 2 seconds or less

## 2020-08-18 NOTE — PHYSICAL EXAM
[No Acute Distress] : no acute distress [Well Nourished] : well nourished [Well Developed] : well developed [Well-Appearing] : well-appearing [Normal Sclera/Conjunctiva] : normal sclera/conjunctiva [PERRL] : pupils equal round and reactive to light [EOMI] : extraocular movements intact [Normal Outer Ear/Nose] : the outer ears and nose were normal in appearance [No JVD] : no jugular venous distention [Normal Oropharynx] : the oropharynx was normal [No Lymphadenopathy] : no lymphadenopathy [Thyroid Normal, No Nodules] : the thyroid was normal and there were no nodules present [Supple] : supple [No Accessory Muscle Use] : no accessory muscle use [No Respiratory Distress] : no respiratory distress  [Normal Rate] : normal rate  [Clear to Auscultation] : lungs were clear to auscultation bilaterally [Regular Rhythm] : with a regular rhythm [Normal S1, S2] : normal S1 and S2 [No Murmur] : no murmur heard [No Carotid Bruits] : no carotid bruits [No Abdominal Bruit] : a ~M bruit was not heard ~T in the abdomen [No Varicosities] : no varicosities [No Palpable Aorta] : no palpable aorta [No Edema] : there was no peripheral edema [Pedal Pulses Present] : the pedal pulses are present [No Extremity Clubbing/Cyanosis] : no extremity clubbing/cyanosis [Soft] : abdomen soft [No Masses] : no abdominal mass palpated [Non-distended] : non-distended [Non Tender] : non-tender [No HSM] : no HSM [Normal Bowel Sounds] : normal bowel sounds [No CVA Tenderness] : no CVA  tenderness [Normal Posterior Cervical Nodes] : no posterior cervical lymphadenopathy [Normal Anterior Cervical Nodes] : no anterior cervical lymphadenopathy [No Joint Swelling] : no joint swelling [No Spinal Tenderness] : no spinal tenderness [Grossly Normal Strength/Tone] : grossly normal strength/tone [No Rash] : no rash [Coordination Grossly Intact] : coordination grossly intact [Normal Gait] : normal gait [No Focal Deficits] : no focal deficits [Normal Affect] : the affect was normal [Normal Insight/Judgement] : insight and judgment were intact

## 2020-08-19 NOTE — HISTORY OF PRESENT ILLNESS
[FreeTextEntry1] : he is following on his condition. [de-identified] : he is complaining of flum stuck in the throat associated with dry cough.  he has to force it out.  The color varies from green to white.  The nose is dry.  He is not using nasal spray.  The nose is congested.  The allergy pills are not helping.   The SOB is about the same.  he is trying to do more.  He is walking the track at least twice a week.  He walks the dog a lot.  The weight is about the same.  He is watching to watch his diet.  No fever.  otherwise he is doing well.

## 2020-08-19 NOTE — HISTORY OF PRESENT ILLNESS
[FreeTextEntry1] : he is following on his condition. [de-identified] : he is complaining of flum stuck in the throat associated with dry cough.  he has to force it out.  The color varies from green to white.  The nose is dry.  He is not using nasal spray.  The nose is congested.  The allergy pills are not helping.   The SOB is about the same.  he is trying to do more.  He is walking the track at least twice a week.  He walks the dog a lot.  The weight is about the same.  He is watching to watch his diet.  No fever.  otherwise he is doing well.

## 2020-08-19 NOTE — ASSESSMENT
[FreeTextEntry1] : CADThe patient is clinically stable with no evidence of underlying angina or CHF. A recent echocardiogram and stress test were unremarkable chest revealed an old defect on the stress test. He is to continue on her current regimen and he is followed by Dr. Castillo\par \par Cardiomyopathy\par \par Patient is on diuretics and also on ARB\par \par Hypertension\par \par The blood pressure is controlled on the current regimen which tolerated well. He is to continue on Valsartan and metoprolol.\par \par Hyperlipidemia\par \par Lipid profile is stable and is on statin which tolerated well.\par \par [id number]\par \par He is on potassium supplementation\par \par Obstructive sleep apnea\par \par Patient is using her CPAP on regular basis most declined.\par \par Patient was for chest x-ray. Patient have a colonoscopy within the last 2 years and will forward the report. Started the patient about the need for flu vaccine this season\par

## 2020-08-19 NOTE — REVIEW OF SYSTEMS
[Postnasal Drip] : postnasal drip [Cough] : cough [Negative] : Heme/Lymph [Earache] : no earache [Nosebleeds] : no nosebleeds [Hearing Loss] : no hearing loss [Sore Throat] : no sore throat [Hoarseness] : no hoarseness [Nasal Discharge] : no nasal discharge [Wheezing] : no wheezing [Shortness Of Breath] : no shortness of breath [Dyspnea on Exertion] : not dyspnea on exertion

## 2020-08-19 NOTE — ASSESSMENT
[FreeTextEntry1] : CADThe patient is clinically stable with no evidence of underlying angina or CHF. A recent echocardiogram and stress test were unremarkable chest revealed an old defect on the stress test. He is to continue on her current regimen and he is followed by Dr. Castillo\par \par Cardiomyopathy\par \par Patient is on diuretics and also on ARB\par \par Hypertension\par \par The blood pressure is controlled on the current regimen which tolerated well. He is to continue on Valsartan and metoprolol.\par \par Hyperlipidemia\par \par Lipid profile is stable and is on statin which tolerated well.\par \par [id number]\par \par He is on potassium supplementation\par \par Obstructive sleep apnea\par \par Patient is using her CPAP on regular basis most declined.\par \par Patient was for chest x-ray. Patient have a colonoscopy within the last 2 years and will forward the report. Started the patient about the need for flu vaccine this season\par

## 2020-08-19 NOTE — REVIEW OF SYSTEMS
[Postnasal Drip] : postnasal drip [Cough] : cough [Negative] : Heme/Lymph [Earache] : no earache [Hearing Loss] : no hearing loss [Nosebleeds] : no nosebleeds [Nasal Discharge] : no nasal discharge [Hoarseness] : no hoarseness [Sore Throat] : no sore throat [Wheezing] : no wheezing [Shortness Of Breath] : no shortness of breath [Dyspnea on Exertion] : not dyspnea on exertion

## 2020-11-12 ENCOUNTER — NON-APPOINTMENT (OUTPATIENT)
Age: 60
End: 2020-11-12

## 2020-11-12 ENCOUNTER — APPOINTMENT (OUTPATIENT)
Dept: HEART AND VASCULAR | Facility: CLINIC | Age: 60
End: 2020-11-12
Payer: MEDICARE

## 2020-11-12 VITALS
WEIGHT: 181 LBS | TEMPERATURE: 98 F | SYSTOLIC BLOOD PRESSURE: 190 MMHG | HEIGHT: 62 IN | HEART RATE: 67 BPM | DIASTOLIC BLOOD PRESSURE: 98 MMHG | BODY MASS INDEX: 33.31 KG/M2

## 2020-11-12 VITALS — DIASTOLIC BLOOD PRESSURE: 96 MMHG | SYSTOLIC BLOOD PRESSURE: 176 MMHG

## 2020-11-12 PROCEDURE — G0008: CPT

## 2020-11-12 PROCEDURE — 99072 ADDL SUPL MATRL&STAF TM PHE: CPT

## 2020-11-12 PROCEDURE — 93000 ELECTROCARDIOGRAM COMPLETE: CPT

## 2020-11-12 PROCEDURE — 90682 RIV4 VACC RECOMBINANT DNA IM: CPT

## 2020-11-12 PROCEDURE — 99214 OFFICE O/P EST MOD 30 MIN: CPT | Mod: 25

## 2020-11-12 RX ORDER — CARVEDILOL 25 MG/1
25 TABLET, FILM COATED ORAL
Qty: 60 | Refills: 0 | Status: DISCONTINUED | COMMUNITY
Start: 2020-06-10

## 2020-11-12 NOTE — REVIEW OF SYSTEMS
[Fever] : no fever [Headache] : no headache [Recent Weight Gain (___ Lbs)] : no recent weight gain [Chills] : no chills [Feeling Fatigued] : not feeling fatigued [Recent Weight Loss (___ Lbs)] : no recent weight loss [Blurry Vision] : no blurred vision [Seeing Double (Diplopia)] : no diplopia [Eyeglasses] : not currently wearing eyeglasses [see HPI] : see HPI [Dyspnea on exertion] : dyspnea during exertion [Chest  Pressure] : no chest pressure [Chest Pain] : no chest pain [Lower Ext Edema] : no extremity edema [Leg Claudication] : no intermittent leg claudication [Palpitations] : no palpitations [Cough] : cough [Wheezing] : no wheezing [Coughing Up Blood] : no hemoptysis [Abdominal Pain] : no abdominal pain [Nausea] : no nausea [Vomiting] : no vomiting [Heartburn] : heartburn [Change in Appetite] : no change in appetite [Change In The Stool] : no change in stool [Dysphagia] : no dysphagia [Hematuria] : no hematuria [Nocturia] : nocturia [Joint Pain] : joint pain [Wrist Pain] : wrist pain [Wrist Stiffness] : stiffness of the wrist [Lower Back Pain] : lower back pain [Dizziness] : no dizziness [Tremor] : no tremor was seen [Numbness (Hypesthesia)] : numbness [Convulsions] : no convulsions [Tingling (Paresthesia)] : no tingling [Confusion] : no confusion was observed [Depression] : no depression [Anxiety] : anxiety [Under Stress] : under stress [Suicidal] : not suicidal [Negative] : Heme/Lymph

## 2020-11-12 NOTE — DISCUSSION/SUMMARY
[FreeTextEntry1] : EKG:NSR,ST/ t wave abn(mild t wave change)\par doubt MONK is cardiac (probnp normal) but will give therapeutic trial of Lasix and asked him to double his KCL(for hypokalemia while on it. his BP is high but he hasn't taken his meds in 2+ days\par resume meds\par cont asa + toprol for CAD;toprol + valsartan for ICM; valsartan + norvasc + chlorthalidone for HPTN; lipitor + repatha for lipids\par RV 1 week

## 2020-11-12 NOTE — PHYSICAL EXAM
[General Appearance - Well Developed] : well developed [Normal Appearance] : normal appearance [Well Groomed] : well groomed [General Appearance - Well Nourished] : well nourished [No Deformities] : no deformities [General Appearance - In No Acute Distress] : no acute distress [Normal Conjunctiva] : the conjunctiva exhibited no abnormalities [Normal Oral Mucosa] : normal oral mucosa [Normal Jugular Venous A Waves Present] : normal jugular venous A waves present [Normal Jugular Venous V Waves Present] : normal jugular venous V waves present [No Jugular Venous Powell A Waves] : no jugular venous powell A waves [FreeTextEntry1] : carotids w/o bruits [] : no respiratory distress [Respiration, Rhythm And Depth] : normal respiratory rhythm and effort [Exaggerated Use Of Accessory Muscles For Inspiration] : no accessory muscle use [Auscultation Breath Sounds / Voice Sounds] : lungs were clear to auscultation bilaterally [Heart Rate And Rhythm] : heart rate and rhythm were normal [Heart Sounds] : normal S1 and S2 [Murmurs] : no murmurs present [Edema] : no peripheral edema present [Bowel Sounds] : normal bowel sounds [Abdomen Soft] : soft [Abdomen Tenderness] : non-tender [Abnormal Walk] : normal gait [Nail Clubbing] : no clubbing of the fingernails [Skin Color & Pigmentation] : normal skin color and pigmentation [Oriented To Time, Place, And Person] : oriented to person, place, and time

## 2020-11-17 ENCOUNTER — APPOINTMENT (OUTPATIENT)
Dept: HEART AND VASCULAR | Facility: CLINIC | Age: 60
End: 2020-11-17
Payer: MEDICARE

## 2020-11-17 ENCOUNTER — NON-APPOINTMENT (OUTPATIENT)
Age: 60
End: 2020-11-17

## 2020-11-17 VITALS
SYSTOLIC BLOOD PRESSURE: 120 MMHG | HEIGHT: 62 IN | HEART RATE: 60 BPM | WEIGHT: 178 LBS | DIASTOLIC BLOOD PRESSURE: 78 MMHG | BODY MASS INDEX: 32.76 KG/M2

## 2020-11-17 VITALS — TEMPERATURE: 98 F

## 2020-11-17 PROCEDURE — 99213 OFFICE O/P EST LOW 20 MIN: CPT | Mod: 25

## 2020-11-17 PROCEDURE — 93000 ELECTROCARDIOGRAM COMPLETE: CPT

## 2020-11-17 PROCEDURE — 36415 COLL VENOUS BLD VENIPUNCTURE: CPT

## 2020-11-17 NOTE — PHYSICAL EXAM
[General Appearance - Well Developed] : well developed [Normal Appearance] : normal appearance [Well Groomed] : well groomed [General Appearance - Well Nourished] : well nourished [No Deformities] : no deformities [General Appearance - In No Acute Distress] : no acute distress [Normal Conjunctiva] : the conjunctiva exhibited no abnormalities [Normal Oral Mucosa] : normal oral mucosa [Normal Jugular Venous A Waves Present] : normal jugular venous A waves present [Normal Jugular Venous V Waves Present] : normal jugular venous V waves present [No Jugular Venous Powell A Waves] : no jugular venous powell A waves [FreeTextEntry1] : carotids w/o bruits [] : no respiratory distress [Respiration, Rhythm And Depth] : normal respiratory rhythm and effort [Exaggerated Use Of Accessory Muscles For Inspiration] : no accessory muscle use [Auscultation Breath Sounds / Voice Sounds] : lungs were clear to auscultation bilaterally [Heart Rate And Rhythm] : heart rate and rhythm were normal [Heart Sounds] : normal S1 and S2 [Murmurs] : no murmurs present [Edema] : no peripheral edema present [Abdomen Soft] : soft [Abdomen Tenderness] : non-tender [Abnormal Walk] : normal gait [Nail Clubbing] : no clubbing of the fingernails [Skin Color & Pigmentation] : normal skin color and pigmentation [Oriented To Time, Place, And Person] : oriented to person, place, and time

## 2020-11-17 NOTE — DISCUSSION/SUMMARY
[FreeTextEntry1] : EKG:NSR, T wave abn\par will cont on Lasix for duque and get CCTA( nuc with mild ischemia but check grafts)- cont norvasc +chlorthalidone +toprol for HPTN; toprol for CAD, lipitor for lipids\par check BNP

## 2020-11-19 LAB
ALBUMIN SERPL ELPH-MCNC: 4.6 G/DL
ALP BLD-CCNC: 79 U/L
ALT SERPL-CCNC: 22 U/L
ANION GAP SERPL CALC-SCNC: 15 MMOL/L
AST SERPL-CCNC: 19 U/L
BILIRUB SERPL-MCNC: 0.5 MG/DL
BUN SERPL-MCNC: 25 MG/DL
CALCIUM SERPL-MCNC: 10.4 MG/DL
CHLORIDE SERPL-SCNC: 94 MMOL/L
CHOLEST SERPL-MCNC: 185 MG/DL
CO2 SERPL-SCNC: 30 MMOL/L
CREAT SERPL-MCNC: 0.93 MG/DL
GLUCOSE SERPL-MCNC: 118 MG/DL
HDLC SERPL-MCNC: 31 MG/DL
LDLC SERPL CALC-MCNC: 105 MG/DL
NONHDLC SERPL-MCNC: 154 MG/DL
NT-PROBNP SERPL-MCNC: 27 PG/ML
POTASSIUM SERPL-SCNC: 3.3 MMOL/L
PROT SERPL-MCNC: 7.3 G/DL
SODIUM SERPL-SCNC: 140 MMOL/L
TRIGL SERPL-MCNC: 245 MG/DL
TSH SERPL-ACNC: 1.45 UIU/ML

## 2020-11-19 RX ORDER — EVOLOCUMAB 140 MG/ML
140 INJECTION, SOLUTION SUBCUTANEOUS
Qty: 2 | Refills: 2 | Status: DISCONTINUED | COMMUNITY
Start: 2020-08-14 | End: 2020-11-19

## 2021-01-14 ENCOUNTER — APPOINTMENT (OUTPATIENT)
Dept: PULMONOLOGY | Facility: CLINIC | Age: 61
End: 2021-01-14

## 2021-02-22 ENCOUNTER — NON-APPOINTMENT (OUTPATIENT)
Age: 61
End: 2021-02-22

## 2021-02-22 ENCOUNTER — APPOINTMENT (OUTPATIENT)
Dept: OPHTHALMOLOGY | Facility: CLINIC | Age: 61
End: 2021-02-22
Payer: MEDICARE

## 2021-02-22 PROCEDURE — 99072 ADDL SUPL MATRL&STAF TM PHE: CPT

## 2021-02-22 PROCEDURE — 92004 COMPRE OPH EXAM NEW PT 1/>: CPT

## 2021-02-22 PROCEDURE — 92015 DETERMINE REFRACTIVE STATE: CPT

## 2021-03-17 ENCOUNTER — APPOINTMENT (OUTPATIENT)
Dept: PULMONOLOGY | Facility: CLINIC | Age: 61
End: 2021-03-17
Payer: MEDICARE

## 2021-03-17 VITALS
WEIGHT: 180 LBS | DIASTOLIC BLOOD PRESSURE: 96 MMHG | SYSTOLIC BLOOD PRESSURE: 165 MMHG | HEIGHT: 62 IN | HEART RATE: 76 BPM | BODY MASS INDEX: 33.13 KG/M2 | OXYGEN SATURATION: 98 % | TEMPERATURE: 97.6 F

## 2021-03-17 PROCEDURE — 99213 OFFICE O/P EST LOW 20 MIN: CPT

## 2021-03-17 PROCEDURE — 99072 ADDL SUPL MATRL&STAF TM PHE: CPT

## 2021-03-17 NOTE — HISTORY OF PRESENT ILLNESS
[FreeTextEntry1] : he is following on his condition. He continues to have cough [de-identified] : He has complaints of cough with feeling his sputum getting stuck in throat. He has to force it out.  The color varies from green to white.  He has post nasal gtt and tried Flonase.  He has been using morning and night. The nose is congested.  He has had cough since 2008.  The allergy pills that some what help.  His acid reflux is better and sometimes wakes up with his throat feeling irritated.   The SOB with running a 1/2 block, he is not SOB at normal pace.   He follow with Dr. Castillo.  He trying to do more exercise. He walks the dog a lot.  The weight is about the same. \par \par Denies fevers, wheezing, chest pain, edema.

## 2021-03-17 NOTE — REVIEW OF SYSTEMS
[Postnasal Drip] : postnasal drip [Cough] : cough [Negative] : Heme/Lymph [Earache] : no earache [Hearing Loss] : no hearing loss [Nosebleeds] : no nosebleeds [Nasal Discharge] : no nasal discharge [Sore Throat] : no sore throat [Hoarseness] : no hoarseness [Shortness Of Breath] : no shortness of breath [Wheezing] : no wheezing [Dyspnea on Exertion] : not dyspnea on exertion

## 2021-03-17 NOTE — ASSESSMENT
[FreeTextEntry1] : Chronic cough\par \par I discussed the differential diagnosis of chronic cough with the patient.  The differential diagnosis include, but not limited to, GERD, postnasal drip, cardiac, and bronchial asthma.  There is no clinical evidence neither of ILD, OLD, thromboembolic disease, nor infectious process. PFT did not show asthma.  pt has been clinically treated for both GERD and Post nasal gtt with no resolution of cough.  \par \par Plan\par - Follow with ENT for further evaluation\par - Follow with dynamic CT scan pt states cough since surgery in 2008 r/o tracheobronchomalacia.  \par \par CAD\par The patient is clinically stable with no evidence of underlying angina or CHF. He is to continue on her current regimen and he is followed by Dr. Castillo\par \par Cardiomyopathy\par \par Patient is on diuretics and also on ARB\par \par Hypertension\par \par The blood pressure is controlled on the current regimen which tolerated well. He is to continue on Valsartan and metoprolol.  He did not take his BP meds this morning instructed him to take when he gets home his BP is elevated. \par \par Hyperlipidemia\par \par Lipid profile is stable and is on statin which tolerated well.\par \par Hypokalemia\par \par He is on potassium supplementation.  Follow lab work\par \par Obstructive sleep apnea\par \par Patient is using her CPAP on regular basis tolerating \par \par Patient was for chest x-ray 8/2020. Patient have a colonoscopy due 11/2021.  Follow on lab work.  Pt is getting covid vaccine tomorrow.  Pt up to date with flu vaccine.   Pt should gt PNA vaccine but waiting till post covid vaccine. \par

## 2021-03-19 ENCOUNTER — TRANSCRIPTION ENCOUNTER (OUTPATIENT)
Age: 61
End: 2021-03-19

## 2021-03-19 LAB
25(OH)D3 SERPL-MCNC: 25.3 NG/ML
ALBUMIN SERPL ELPH-MCNC: 4.5 G/DL
ALP BLD-CCNC: 70 U/L
ALT SERPL-CCNC: 32 U/L
ANION GAP SERPL CALC-SCNC: 20 MMOL/L
APPEARANCE: CLEAR
AST SERPL-CCNC: 23 U/L
BASOPHILS # BLD AUTO: 0.05 K/UL
BASOPHILS NFR BLD AUTO: 0.7 %
BILIRUB SERPL-MCNC: 0.3 MG/DL
BILIRUBIN URINE: NEGATIVE
BLOOD URINE: NEGATIVE
BUN SERPL-MCNC: 9 MG/DL
CALCIUM SERPL-MCNC: 9.5 MG/DL
CHLORIDE SERPL-SCNC: 102 MMOL/L
CHOLEST SERPL-MCNC: 199 MG/DL
CO2 SERPL-SCNC: 22 MMOL/L
COLOR: NORMAL
CREAT SERPL-MCNC: 0.69 MG/DL
EOSINOPHIL # BLD AUTO: 0.23 K/UL
EOSINOPHIL NFR BLD AUTO: 3.4 %
ESTIMATED AVERAGE GLUCOSE: 128 MG/DL
GLUCOSE QUALITATIVE U: NEGATIVE
GLUCOSE SERPL-MCNC: 148 MG/DL
HBA1C MFR BLD HPLC: 6.1 %
HCT VFR BLD CALC: 46.9 %
HDLC SERPL-MCNC: 37 MG/DL
HGB BLD-MCNC: 15.3 G/DL
IMM GRANULOCYTES NFR BLD AUTO: 0.3 %
KETONES URINE: NEGATIVE
LDLC SERPL CALC-MCNC: 127 MG/DL
LEUKOCYTE ESTERASE URINE: NEGATIVE
LYMPHOCYTES # BLD AUTO: 1.96 K/UL
LYMPHOCYTES NFR BLD AUTO: 29.2 %
MAN DIFF?: NORMAL
MCHC RBC-ENTMCNC: 28.5 PG
MCHC RBC-ENTMCNC: 32.6 GM/DL
MCV RBC AUTO: 87.3 FL
MONOCYTES # BLD AUTO: 0.59 K/UL
MONOCYTES NFR BLD AUTO: 8.8 %
NEUTROPHILS # BLD AUTO: 3.86 K/UL
NEUTROPHILS NFR BLD AUTO: 57.6 %
NITRITE URINE: NEGATIVE
NONHDLC SERPL-MCNC: 162 MG/DL
PH URINE: 7
PLATELET # BLD AUTO: 300 K/UL
POTASSIUM SERPL-SCNC: 3.1 MMOL/L
PROT SERPL-MCNC: 7.1 G/DL
PROTEIN URINE: NEGATIVE
PSA SERPL-MCNC: 1.06 NG/ML
RBC # BLD: 5.37 M/UL
RBC # FLD: 15.2 %
SODIUM SERPL-SCNC: 143 MMOL/L
SPECIFIC GRAVITY URINE: 1.01
T3 SERPL-MCNC: 126 NG/DL
T4 SERPL-MCNC: 7.2 UG/DL
TRIGL SERPL-MCNC: 176 MG/DL
TSH SERPL-ACNC: 1.24 UIU/ML
UROBILINOGEN URINE: NORMAL
WBC # FLD AUTO: 6.71 K/UL

## 2021-03-26 ENCOUNTER — OUTPATIENT (OUTPATIENT)
Dept: OUTPATIENT SERVICES | Facility: HOSPITAL | Age: 61
LOS: 1 days | End: 2021-03-26

## 2021-03-26 ENCOUNTER — APPOINTMENT (OUTPATIENT)
Dept: CT IMAGING | Facility: CLINIC | Age: 61
End: 2021-03-26
Payer: MEDICARE

## 2021-03-26 ENCOUNTER — RESULT REVIEW (OUTPATIENT)
Age: 61
End: 2021-03-26

## 2021-03-26 PROCEDURE — 71250 CT THORAX DX C-: CPT | Mod: 26

## 2021-03-31 ENCOUNTER — TRANSCRIPTION ENCOUNTER (OUTPATIENT)
Age: 61
End: 2021-03-31

## 2021-04-01 ENCOUNTER — APPOINTMENT (OUTPATIENT)
Dept: OTOLARYNGOLOGY | Facility: CLINIC | Age: 61
End: 2021-04-01
Payer: MEDICARE

## 2021-04-01 VITALS
HEART RATE: 76 BPM | HEIGHT: 62 IN | DIASTOLIC BLOOD PRESSURE: 108 MMHG | SYSTOLIC BLOOD PRESSURE: 204 MMHG | TEMPERATURE: 96.2 F | BODY MASS INDEX: 33.49 KG/M2 | WEIGHT: 182 LBS

## 2021-04-01 DIAGNOSIS — Z82.3 FAMILY HISTORY OF STROKE: ICD-10-CM

## 2021-04-01 DIAGNOSIS — H93.8X3 OTHER SPECIFIED DISORDERS OF EAR, BILATERAL: ICD-10-CM

## 2021-04-01 DIAGNOSIS — Z86.79 PERSONAL HISTORY OF OTHER DISEASES OF THE CIRCULATORY SYSTEM: ICD-10-CM

## 2021-04-01 DIAGNOSIS — H61.23 IMPACTED CERUMEN, BILATERAL: ICD-10-CM

## 2021-04-01 DIAGNOSIS — Z86.39 PERSONAL HISTORY OF OTHER ENDOCRINE, NUTRITIONAL AND METABOLIC DISEASE: ICD-10-CM

## 2021-04-01 PROCEDURE — 99204 OFFICE O/P NEW MOD 45 MIN: CPT | Mod: 25

## 2021-04-01 PROCEDURE — 99072 ADDL SUPL MATRL&STAF TM PHE: CPT

## 2021-04-01 PROCEDURE — 31575 DIAGNOSTIC LARYNGOSCOPY: CPT

## 2021-04-01 RX ORDER — TADALAFIL 5 MG/1
5 TABLET ORAL
Qty: 10 | Refills: 1 | Status: COMPLETED | COMMUNITY
Start: 2018-07-02 | End: 2021-04-01

## 2021-04-02 ENCOUNTER — TRANSCRIPTION ENCOUNTER (OUTPATIENT)
Age: 61
End: 2021-04-02

## 2021-04-12 PROBLEM — Z86.79 HISTORY OF CORONARY ARTERY DISEASE: Status: RESOLVED | Noted: 2021-04-12 | Resolved: 2021-04-12

## 2021-04-12 NOTE — HISTORY OF PRESENT ILLNESS
[de-identified] : Mr. CAM is a 61 year old man who was referred by Dr. Rodriguez for cough.\par PMH:  HTN, HTL, CAD s/p CABG, s/p pacemaker, AI, carpal tunnel and restrictive lung disease\par \par Cough started in the 2000s when he was found to have a heart condition.\par Cough sometimes is dry and sometimes has phlegm which he has problems getting out. The cough has worsened.\par Cough wakes him up from sleep, so he has to drink a lot of water.\par Treated previously for acid reflux which didn’t improve cough. Tried cough medicine, nasal spray, steam which didn’t help cough. \par He had regular coughing when he lived in [place] in [id number].\par Throat is a little irritated trip he swallows but no actual problem getting bolus down.\par Occasional postnasal drip.  Has allergies to things like dust. He tried benadryl with no change. \par He was on an inhaler in the past from Dr. Rodriguez which helped the cough.\par Gets SOB on exertion, which also stimulates the cough.\par \par Feels his hearing is reduced due to wax clogging his ears. No hearing loss otherwise.  \par No tinnitus, ear pain or vertigo.\par \par Hx of JANES, on CPAP, awaiting new tubing\par

## 2021-04-12 NOTE — ASSESSMENT
[FreeTextEntry1] : Mr. CAM was evaluated for the following issues today:\par \par 1.) chronic cough for over 15 years, getting worse and preventing him from sleeping through the night.\par Has restrictive lung disease.  Use of inhaler in past helped diminish the cough.\par I do not see a source of cough in upper airway.  Likely related to pulmonary issues.\par --> start Flovent inhaler as a trial\par --> albuterol inhaler PRN SOB\par --> f/u with Dr. Rodriguez\par \par 2.) Bilateral cerumen impaction removed with H202 and suction.  He now feels clogged ears are relieved and hearing is back to normal.\par --> stop using qtips\par \par 3.) JANES on CPAP\par  - he is to call his [employer] company for the new tubing so he can start using machine again\par \par Return via [facility] in 3 weeks

## 2021-04-12 NOTE — PROCEDURE
[FreeTextEntry6] : \par Indication: allergic rhinitis\par -Verbal consent was obtained from patient prior to exam. \par - Miguel-Synephrine and lidocaine 2% spray applied to nose bilaterally.\par Nasal endoscopy was performed with flexible scope.\par Findings: \par -- Inferior turbinates normal    bilateral.  Inferior meatus clear bilateral.\par -- Septum was deviated to the left  \par -- No polyps either side nose\par -- Mucus clear bilateral\par -- Middle and superior turbinates normal on right. Left middle turbinate not visible due to septum\par -- Middle meatus clear on right; not visible on left. SER clear bilateral.\par -- Nasopharynx without mass or exudate\par -- Adenoids were absent   \par -- Eustachian orifices were clear bilateral\par \par The patient tolerated the procedure well.\par  [de-identified] : \par Indication:  JANES, Chronic  Cough \par -Verbal consent was obtained from patient prior to procedure.\par -Miguel-Synephrine and lidocaine 2% spray applied to the nasal cavities.\par Flexible laryngoscopy was performed via right nostril and revealed the following:\par   -- Nasopharynx had no mass or exudate.\par   -- Base of tongue was symmetric and not enlarged.\par   -- Prominent bilateral lingual tonsils, with mucocele on left lingual tonsil \par   -- Epiglottis, both aryepiglottic folds and both false vocal folds were normal\par   -- Arytenoids both without edema and erythema \par   -- True vocal folds were swollen, fully mobile and without lesions. \par   -- Post cricoid area was clear.\par   -- Interarytenoid edema was absent     \par   -- No lesions seen in laryngopharynx\par \par The patient tolerated the procedure well.\par

## 2021-04-12 NOTE — REVIEW OF SYSTEMS
[Patient Intake Form Reviewed] : Patient intake form was reviewed [Nasal Congestion] : nasal congestion [Discolored Nasal Discharge] : discolored nasal discharge [Eyesight Problems] : eyesight problems [Dry Eyes] : dry eyes [Eyes Itch] : itching of the eyes [Shortness Of Breath] : shortness of breath [Cough] : cough [Anxiety] : anxiety [Negative] : Heme/Lymph [As Noted in HPI] : as noted in HPI [FreeTextEntry5] : high bp, heart surgery, pacemaker

## 2021-04-12 NOTE — PHYSICAL EXAM
[] : septum deviated to the left [Midline] : trachea located in midline position [Normal] : palatine tonsils are normal [Laryngoscopy Performed] : laryngoscopy was performed, see procedure section for findings [FreeTextEntry1] : No hoarseness. [de-identified] : Bilateral cerumen impaction removed with H202 and suction. [de-identified] : Mallampati 3 airway [de-identified] : 3+ bilateral  [de-identified] : redundant posterior pharyngeal folds and long uvula [de-identified] : Carotid pulses 2+ bilateral.

## 2021-04-12 NOTE — CONSULT LETTER
[Dear  ___] : Dear  [unfilled], [( Thank you for referring [unfilled] for consultation for _____ )] : Thank you for referring [unfilled] for consultation for [unfilled] [Please see my note below.] : Please see my note below. [Consult Closing:] : Thank you very much for allowing me to participate in the care of this patient.  If you have any questions, please do not hesitate to contact me. [Sincerely,] : Sincerely, [FreeTextEntry2] : Laquita Rodriguez M.D.\par [facility] \par Pulmonary Medicine\par 4th Floor\par [place], [place] [id number]\par  [FreeTextEntry3] : \par Virginia Orozco MD \par Otolaryngology, Head and Neck Surgery \par \par

## 2021-05-04 ENCOUNTER — NON-APPOINTMENT (OUTPATIENT)
Age: 61
End: 2021-05-04

## 2021-05-04 ENCOUNTER — APPOINTMENT (OUTPATIENT)
Dept: HEART AND VASCULAR | Facility: CLINIC | Age: 61
End: 2021-05-04
Payer: MEDICARE

## 2021-05-04 VITALS
DIASTOLIC BLOOD PRESSURE: 100 MMHG | HEART RATE: 67 BPM | BODY MASS INDEX: 33.13 KG/M2 | WEIGHT: 180 LBS | HEIGHT: 62 IN | SYSTOLIC BLOOD PRESSURE: 200 MMHG | OXYGEN SATURATION: 97 %

## 2021-05-04 VITALS — SYSTOLIC BLOOD PRESSURE: 164 MMHG | DIASTOLIC BLOOD PRESSURE: 100 MMHG

## 2021-05-04 PROCEDURE — 93000 ELECTROCARDIOGRAM COMPLETE: CPT

## 2021-05-04 PROCEDURE — 99072 ADDL SUPL MATRL&STAF TM PHE: CPT

## 2021-05-04 PROCEDURE — 99214 OFFICE O/P EST MOD 30 MIN: CPT | Mod: 25

## 2021-05-04 PROCEDURE — 93306 TTE W/DOPPLER COMPLETE: CPT

## 2021-05-04 RX ORDER — MONTELUKAST 10 MG/1
10 TABLET, FILM COATED ORAL
Qty: 3 | Refills: 3 | Status: DISCONTINUED | COMMUNITY
Start: 2019-08-29 | End: 2021-05-04

## 2021-05-04 NOTE — PHYSICAL EXAM
[Well Developed] : well developed [Well Nourished] : well nourished [No Acute Distress] : no acute distress [Normal Conjunctiva] : normal conjunctiva [Normal Venous Pressure] : normal venous pressure [No Carotid Bruit] : no carotid bruit [Normal S1, S2] : normal S1, S2 [No Murmur] : no murmur [No Rub] : no rub [No Gallop] : no gallop [Clear Lung Fields] : clear lung fields [Good Air Entry] : good air entry [No Respiratory Distress] : no respiratory distress  [Soft] : abdomen soft [Non Tender] : non-tender [Normal Bowel Sounds] : normal bowel sounds [Normal Gait] : normal gait [No Edema] : no edema [No Clubbing] : no clubbing [No Rash] : no rash [Moves all extremities] : moves all extremities [No Focal Deficits] : no focal deficits [Normal Speech] : normal speech [Alert and Oriented] : alert and oriented [Normal memory] : normal memory

## 2021-05-04 NOTE — HISTORY OF PRESENT ILLNESS
[FreeTextEntry1] : No CP/sob\par BP running high\par drinks 3+ cups coffee/day\par sees ophthalmologist for eye problems\par Not taking Praluent as says too expensive

## 2021-05-04 NOTE — REVIEW OF SYSTEMS
[Blurry Vision] : blurred vision [Seeing Double (Diplopia)] : diplopia [Eye Pain] : eye pain [Cough] : cough [Wheezing] : no wheezing [Hematuria] : hematuria [Nocturia] : nocturia [Joint Pain] : joint pain [Lower Back Pain] : lower back pain [Negative] : Gastrointestinal

## 2021-05-04 NOTE — DISCUSSION/SUMMARY
[FreeTextEntry1] : EKG:NSR,NSST abn\par ECHO: mild-mod AR,mild MR\par BP high- will add Catapres and increase as needed and cont chlorthalidone+Norvasc +Diovan for hptn- reviewed labs done in march- target LDL<70mg% so will add Zetia to Lipitor for lipids; cont asa +Plavix for CAD\par RV 2 weeks for BP

## 2021-05-20 ENCOUNTER — NON-APPOINTMENT (OUTPATIENT)
Age: 61
End: 2021-05-20

## 2021-05-20 ENCOUNTER — APPOINTMENT (OUTPATIENT)
Dept: HEART AND VASCULAR | Facility: CLINIC | Age: 61
End: 2021-05-20
Payer: MEDICARE

## 2021-05-20 VITALS
BODY MASS INDEX: 32.94 KG/M2 | HEART RATE: 90 BPM | HEIGHT: 62 IN | TEMPERATURE: 97.6 F | WEIGHT: 179 LBS | DIASTOLIC BLOOD PRESSURE: 85 MMHG | SYSTOLIC BLOOD PRESSURE: 110 MMHG

## 2021-05-20 VITALS — SYSTOLIC BLOOD PRESSURE: 120 MMHG | DIASTOLIC BLOOD PRESSURE: 70 MMHG

## 2021-05-20 LAB
ANION GAP SERPL CALC-SCNC: 17 MMOL/L
BUN SERPL-MCNC: 20 MG/DL
CALCIUM SERPL-MCNC: 10.3 MG/DL
CHLORIDE SERPL-SCNC: 98 MMOL/L
CO2 SERPL-SCNC: 23 MMOL/L
CREAT SERPL-MCNC: 0.79 MG/DL
GLUCOSE SERPL-MCNC: 262 MG/DL
NT-PROBNP SERPL-MCNC: 185 PG/ML
POTASSIUM SERPL-SCNC: 3.2 MMOL/L
SODIUM SERPL-SCNC: 137 MMOL/L

## 2021-05-20 PROCEDURE — 99072 ADDL SUPL MATRL&STAF TM PHE: CPT

## 2021-05-20 PROCEDURE — 99214 OFFICE O/P EST MOD 30 MIN: CPT | Mod: 25

## 2021-05-20 PROCEDURE — 36415 COLL VENOUS BLD VENIPUNCTURE: CPT

## 2021-05-20 PROCEDURE — 93000 ELECTROCARDIOGRAM COMPLETE: CPT

## 2021-05-20 RX ORDER — CLONIDINE HYDROCHLORIDE 0.1 MG/1
0.1 TABLET ORAL TWICE DAILY
Qty: 60 | Refills: 1 | Status: DISCONTINUED | COMMUNITY
Start: 2021-05-04 | End: 2021-05-20

## 2021-05-20 NOTE — HISTORY OF PRESENT ILLNESS
[FreeTextEntry1] : stopped clonidine to dry mouth and visual problems- has his chronic MONK. had spinal epidural yesterday

## 2021-05-20 NOTE — DISCUSSION/SUMMARY
[FreeTextEntry1] : EKG:NSR,NSSTTwave abn(no change)\par check BMP for hypoklaemia(on last labs)- check proBNP for duque( chronic duque and normal/lo proBNP previously),but feel duque is pulmonary\par cont asa +Plavix for CAD; lipitor for lipids; norvasc + chlorthalidone + diovan  for HPTN; cont lasix for ICM\par meds renewed

## 2021-05-20 NOTE — REVIEW OF SYSTEMS
[Weight Loss (___ Lbs)] : [unfilled] ~Ulb weight loss [Dyspnea on exertion] : dyspnea during exertion [Cough] : cough [Nocturia] : nocturia [Joint Pain] : joint pain [Negative] : Heme/Lymph [Headache] : no headache [Feeling Fatigued] : not feeling fatigued [Chest Discomfort] : no chest discomfort [Lower Ext Edema] : no extremity edema [Leg Claudication] : no intermittent leg claudication [Palpitations] : no palpitations [Orthopnea] : no orthopnea [PND] : no PND [Syncope] : no syncope [Wheezing] : no wheezing [Hematuria] : no hematuria

## 2021-05-20 NOTE — PHYSICAL EXAM
[Well Developed] : well developed [Well Nourished] : well nourished [No Acute Distress] : no acute distress [Normal Conjunctiva] : normal conjunctiva [Normal Venous Pressure] : normal venous pressure [No Carotid Bruit] : no carotid bruit [Normal S1, S2] : normal S1, S2 [No Murmur] : no murmur [Clear Lung Fields] : clear lung fields [Good Air Entry] : good air entry [No Respiratory Distress] : no respiratory distress  [Soft] : abdomen soft [Non Tender] : non-tender [Normal Bowel Sounds] : normal bowel sounds [Normal Gait] : normal gait [No Edema] : no edema [No Cyanosis] : no cyanosis [No Varicosities] : no varicosities [No Rash] : no rash [Moves all extremities] : moves all extremities [No Focal Deficits] : no focal deficits [Normal Speech] : normal speech [Alert and Oriented] : alert and oriented [Normal memory] : normal memory

## 2021-07-20 ENCOUNTER — APPOINTMENT (OUTPATIENT)
Dept: PULMONOLOGY | Facility: CLINIC | Age: 61
End: 2021-07-20
Payer: MEDICARE

## 2021-07-20 VITALS
TEMPERATURE: 97.9 F | BODY MASS INDEX: 32.94 KG/M2 | HEIGHT: 62 IN | DIASTOLIC BLOOD PRESSURE: 87 MMHG | SYSTOLIC BLOOD PRESSURE: 117 MMHG | HEART RATE: 68 BPM | OXYGEN SATURATION: 98 % | WEIGHT: 179 LBS

## 2021-07-20 PROCEDURE — 94729 DIFFUSING CAPACITY: CPT

## 2021-07-20 PROCEDURE — 99072 ADDL SUPL MATRL&STAF TM PHE: CPT

## 2021-07-20 PROCEDURE — 36415 COLL VENOUS BLD VENIPUNCTURE: CPT

## 2021-07-20 PROCEDURE — 99214 OFFICE O/P EST MOD 30 MIN: CPT | Mod: 25

## 2021-07-20 PROCEDURE — ZZZZZ: CPT

## 2021-07-20 PROCEDURE — 94060 EVALUATION OF WHEEZING: CPT

## 2021-07-20 PROCEDURE — 94727 GAS DIL/WSHOT DETER LNG VOL: CPT

## 2021-07-20 RX ORDER — NORTRIPTYLINE HYDROCHLORIDE 25 MG/1
25 CAPSULE ORAL
Qty: 30 | Refills: 0 | Status: DISCONTINUED | COMMUNITY
Start: 2020-06-10 | End: 2021-07-20

## 2021-07-20 NOTE — END OF VISIT
[Time Spent: ___ minutes] : I have spent [unfilled] minutes of time on the encounter. [>50% of the face to face encounter time was spent on counseling and/or coordination of care for ___] : Greater than 50% of the face to face encounter time was spent on counseling and/or coordination of care for [unfilled] [FreeTextEntry3] : Pt seen with ADIEL Rogers and agree on the above plan of care.

## 2021-07-20 NOTE — ASSESSMENT
[FreeTextEntry1] : Chronic cough\par \par I discussed the differential diagnosis of chronic cough with the patient.  The differential diagnosis include, but not limited to, GERD, postnasal drip, cardiac, and bronchial asthma.  There is no clinical evidence neither of ILD, OLD, thromboembolic disease, nor infectious process. PFT did not show asthma.  pt has been clinically treated for both GERD and Post nasal gtt with no resolution of cough.  dynamic CT scan did not show any signs of tracheobronchomalacia.  Lab work done prior to Dr. Castillo no signs of HF.  Most likely related to post nasal gtt on exam signs of nasal congestion.  Lung exam unremarkable and pulse ox 98%\par \par - Restart Flonase and Flovent\par - PFT performed today showed normal spirometry, no change with bronchodilator, lung capacity moderately decreased, with normal DLCO.  Discussed the PFT with pt. \par \par CAD\par The patient is clinically stable with no evidence of underlying angina or CHF. He is to continue on her current regimen and he is followed by Dr. Castillo\par \par Cardiomyopathy\par \par Patient is on diuretics and also on ARB\par \par Hypertension\par \par The blood pressure is controlled on the current regimen which tolerated well. He is to continue on Valsartan and metoprolol.  \par \par Hyperlipidemia\par \par Lipid profile is stable and is on statin which tolerated well.\par \par Hypokalemia\par \par He is on potassium supplementation.  Follow lab work BMP drawn today \par \par Obstructive sleep apnea\par \par Patient is using her CPAP on regular basis tolerating.  He broke the machine so he is following with DME company.\par \par Patient for repeat CT scan March 2022. Patient have a colonoscopy due 11/2021.  Reviewed prior lab work. Pt got his COVID vaccine.  Pt up to date with flu vaccine.\par

## 2021-07-20 NOTE — REVIEW OF SYSTEMS
[Postnasal Drip] : postnasal drip [Cough] : cough [Negative] : Heme/Lymph [Dyspnea on Exertion] : dyspnea on exertion [Earache] : no earache [Hearing Loss] : no hearing loss [Nosebleeds] : no nosebleeds [Nasal Discharge] : no nasal discharge [Sore Throat] : no sore throat [Hoarseness] : no hoarseness [Shortness Of Breath] : no shortness of breath [Wheezing] : no wheezing

## 2021-07-20 NOTE — HISTORY OF PRESENT ILLNESS
[FreeTextEntry1] : he is following on his condition. He continues to have cough but it is improved but persists.   [de-identified] : He has complaints of cough with feeling his sputum getting stuck in throat. He has to force it out.  His cough got better and it has come back last month.  Worse when he lyes flat.  He was using the flonase when his cough improved.   He has post nasal gtt and tried Flonase. he is not using the albuterol and has not been been using the flovent he is out. He is having nasal dryness and GERD.   He has had cough since 2008.  The allergy pills that some what help.  His acid reflux is better and sometimes wakes up with his throat feeling irritated.   The SOB with going up stairs. Denies fevers, wheezing, chest pain, edema, diarrhea.

## 2021-07-24 LAB
ANION GAP SERPL CALC-SCNC: 17 MMOL/L
BUN SERPL-MCNC: 19 MG/DL
CALCIUM SERPL-MCNC: 10.4 MG/DL
CHLORIDE SERPL-SCNC: 97 MMOL/L
CO2 SERPL-SCNC: 28 MMOL/L
CREAT SERPL-MCNC: 0.91 MG/DL
GLUCOSE SERPL-MCNC: 93 MG/DL
POTASSIUM SERPL-SCNC: 3 MMOL/L
SODIUM SERPL-SCNC: 142 MMOL/L

## 2021-09-06 ENCOUNTER — RX RENEWAL (OUTPATIENT)
Age: 61
End: 2021-09-06

## 2021-09-07 ENCOUNTER — RX RENEWAL (OUTPATIENT)
Age: 61
End: 2021-09-07

## 2021-11-02 ENCOUNTER — NON-APPOINTMENT (OUTPATIENT)
Age: 61
End: 2021-11-02

## 2021-11-02 ENCOUNTER — APPOINTMENT (OUTPATIENT)
Dept: HEART AND VASCULAR | Facility: CLINIC | Age: 61
End: 2021-11-02
Payer: MEDICARE

## 2021-11-02 VITALS
WEIGHT: 182 LBS | HEIGHT: 62 IN | SYSTOLIC BLOOD PRESSURE: 110 MMHG | BODY MASS INDEX: 33.49 KG/M2 | HEART RATE: 74 BPM | TEMPERATURE: 98.6 F | DIASTOLIC BLOOD PRESSURE: 60 MMHG | OXYGEN SATURATION: 98 %

## 2021-11-02 VITALS — SYSTOLIC BLOOD PRESSURE: 148 MMHG | DIASTOLIC BLOOD PRESSURE: 88 MMHG

## 2021-11-02 PROCEDURE — 93000 ELECTROCARDIOGRAM COMPLETE: CPT

## 2021-11-02 PROCEDURE — G0008: CPT

## 2021-11-02 PROCEDURE — 99214 OFFICE O/P EST MOD 30 MIN: CPT | Mod: 25

## 2021-11-02 PROCEDURE — 36415 COLL VENOUS BLD VENIPUNCTURE: CPT

## 2021-11-02 PROCEDURE — 90682 RIV4 VACC RECOMBINANT DNA IM: CPT

## 2021-11-02 RX ORDER — POTASSIUM CHLORIDE 1500 MG/1
20 TABLET, EXTENDED RELEASE ORAL
Qty: 180 | Refills: 1 | Status: DISCONTINUED | COMMUNITY
Start: 2020-08-14 | End: 2021-11-02

## 2021-11-02 NOTE — PHYSICAL EXAM
[Well Developed] : well developed [Well Nourished] : well nourished [No Acute Distress] : no acute distress [Normal Conjunctiva] : normal conjunctiva [Normal Venous Pressure] : normal venous pressure [No Carotid Bruit] : no carotid bruit [Normal S1, S2] : normal S1, S2 [No Murmur] : no murmur [Clear Lung Fields] : clear lung fields [Good Air Entry] : good air entry [No Respiratory Distress] : no respiratory distress  [Soft] : abdomen soft [Non Tender] : non-tender [Normal Bowel Sounds] : normal bowel sounds [Normal Gait] : normal gait [No Edema] : no edema [No Cyanosis] : no cyanosis [No Varicosities] : no varicosities [No Rash] : no rash [Moves all extremities] : moves all extremities [Normal Speech] : normal speech [Alert and Oriented] : alert and oriented

## 2021-11-02 NOTE — DISCUSSION/SUMMARY
[FreeTextEntry1] : EKG:NSR,NSST abn(? hypokalemia)- feel duque is pulmonary\par continue asa +Plavix +toprol for CAD ; lipitor + Zetia for lipids( discussed repatha but states it's too expensive)- will reduce chlorthalidone for lightheadedness and continue norvasc + Diovan for HPTN;toprol + Diovan +Lasix for ICM\par meds renewed\par will check Potassium level and resume KCL if hypokalemia persist(currently taking OTC K-90mg QOD- he needs potassium supplementation

## 2021-11-02 NOTE — HISTORY OF PRESENT ILLNESS
[FreeTextEntry1] : ran out of potassium- has chronic MONK and reports getting lightheaded when stands up or bends over

## 2021-11-03 LAB
ALBUMIN SERPL ELPH-MCNC: 4.8 G/DL
ALP BLD-CCNC: 79 U/L
ALT SERPL-CCNC: 38 U/L
ANION GAP SERPL CALC-SCNC: 21 MMOL/L
AST SERPL-CCNC: 23 U/L
BILIRUB SERPL-MCNC: 0.4 MG/DL
BUN SERPL-MCNC: 15 MG/DL
CALCIUM SERPL-MCNC: 10 MG/DL
CHLORIDE SERPL-SCNC: 99 MMOL/L
CHOLEST SERPL-MCNC: 170 MG/DL
CO2 SERPL-SCNC: 24 MMOL/L
CREAT SERPL-MCNC: 0.84 MG/DL
GLUCOSE SERPL-MCNC: 143 MG/DL
HDLC SERPL-MCNC: 34 MG/DL
LDLC SERPL CALC-MCNC: 104 MG/DL
NONHDLC SERPL-MCNC: 136 MG/DL
POTASSIUM SERPL-SCNC: 3.7 MMOL/L
PROT SERPL-MCNC: 7.3 G/DL
SODIUM SERPL-SCNC: 144 MMOL/L
TRIGL SERPL-MCNC: 161 MG/DL

## 2021-11-03 RX ORDER — EZETIMIBE 10 MG/1
10 TABLET ORAL
Qty: 90 | Refills: 0 | Status: DISCONTINUED | COMMUNITY
Start: 2021-05-04 | End: 2021-11-03

## 2021-11-11 ENCOUNTER — APPOINTMENT (OUTPATIENT)
Dept: PULMONOLOGY | Facility: CLINIC | Age: 61
End: 2021-11-11

## 2022-01-06 ENCOUNTER — APPOINTMENT (OUTPATIENT)
Dept: PULMONOLOGY | Facility: CLINIC | Age: 62
End: 2022-01-06

## 2022-03-11 ENCOUNTER — APPOINTMENT (OUTPATIENT)
Dept: CT IMAGING | Facility: HOSPITAL | Age: 62
End: 2022-03-11
Payer: MEDICARE

## 2022-03-11 ENCOUNTER — OUTPATIENT (OUTPATIENT)
Dept: OUTPATIENT SERVICES | Facility: HOSPITAL | Age: 62
LOS: 1 days | End: 2022-03-11
Payer: MEDICARE

## 2022-03-11 DIAGNOSIS — R91.8 OTHER NONSPECIFIC ABNORMAL FINDING OF LUNG FIELD: ICD-10-CM

## 2022-03-11 PROCEDURE — 71250 CT THORAX DX C-: CPT

## 2022-03-11 PROCEDURE — 71250 CT THORAX DX C-: CPT | Mod: 26

## 2022-03-17 ENCOUNTER — APPOINTMENT (OUTPATIENT)
Dept: HEART AND VASCULAR | Facility: CLINIC | Age: 62
End: 2022-03-17

## 2022-03-18 ENCOUNTER — APPOINTMENT (OUTPATIENT)
Dept: PULMONOLOGY | Facility: CLINIC | Age: 62
End: 2022-03-18
Payer: MEDICARE

## 2022-03-18 PROCEDURE — 99443: CPT

## 2022-03-18 NOTE — ASSESSMENT
[FreeTextEntry1] : Pulmonary nodules\par \par Reviewed the CT scan there is stability and the CAT scan will go with yearly chest x-rays.  No evidence of underlying malignancy.  No evidence of tracheobronchomalacia.\par \par Struct of sleep apnea.  His CPAP need to be adjusted advised the patient to come to the office for adjustment\par \par I will follow-up in the office regarding his chest tightness was seen by cardiology most likely related to his pulmonary status.

## 2022-03-18 NOTE — HISTORY OF PRESENT ILLNESS
[Former] : former [Never] : never [TextBox_4] : His chest a little tight and may be the weather.  He does not use the Ventolin more than usual.  He is still taking the Flovent.  His other cardiologist.  He wants to know if the results of the CAT scan

## 2022-03-29 ENCOUNTER — APPOINTMENT (OUTPATIENT)
Dept: PULMONOLOGY | Facility: CLINIC | Age: 62
End: 2022-03-29
Payer: MEDICARE

## 2022-03-29 VITALS
HEIGHT: 62 IN | HEART RATE: 93 BPM | OXYGEN SATURATION: 97 % | WEIGHT: 182 LBS | SYSTOLIC BLOOD PRESSURE: 144 MMHG | BODY MASS INDEX: 33.49 KG/M2 | TEMPERATURE: 98.2 F | DIASTOLIC BLOOD PRESSURE: 88 MMHG

## 2022-03-29 DIAGNOSIS — J44.9 CHRONIC OBSTRUCTIVE PULMONARY DISEASE, UNSPECIFIED: ICD-10-CM

## 2022-03-29 DIAGNOSIS — R05.3 CHRONIC COUGH: ICD-10-CM

## 2022-03-29 PROCEDURE — 36415 COLL VENOUS BLD VENIPUNCTURE: CPT

## 2022-03-29 PROCEDURE — 99214 OFFICE O/P EST MOD 30 MIN: CPT | Mod: 25

## 2022-03-29 RX ORDER — ALIROCUMAB 75 MG/ML
75 INJECTION, SOLUTION SUBCUTANEOUS
Qty: 3 | Refills: 3 | Status: DISCONTINUED | COMMUNITY
Start: 2021-11-03 | End: 2022-03-29

## 2022-03-29 NOTE — HISTORY OF PRESENT ILLNESS
[FreeTextEntry1] : he is following up [de-identified] : he is feeling good and coughing is very less.  He is urinating a lot day and night.  Otherwise he is doing well.  he is complaint with his medicine.  He is complaint with salt intake and adjusted his diet.  he started to exercise.  No smoking. he is sleeping badly.  he is cutting the sleep medicine in half otherwise he has problem sleeping.  The urinates large amount of urine and no hesitancy

## 2022-03-29 NOTE — ASSESSMENT
[FreeTextEntry1] : Sleep apnea\par \par The patient has not been using his CPAP.  Our sleep specialist is setting up his machine and he was advised to be compliant with it. \par \par  Hypertension.  \par \par Continue antihypertensive meds which is tolerated well.  Blood pressure is controlled.  He is compliant with diet salt intake and started exercising.  He will be more compliant with the CPAP mask.\par \par Coronary artery disease cardiomyopathy and aortic valve insufficiency\par \par He is followed with a cardiologist.  The patient is clinically stable with no clinical evidence of decompensation at this point.  The patient is compliant with diet and salt and exercise. \par \par Obstructive lung disease\par \par He is on the Flovent and rarely used the albuterol\par \par Hyperlipidemia\par \par Improved\par \par Multiple nodules\par \par Is due for her yearly chest x-ray.\par \par Follow-up on the labs.  Informed the patient that his excessive urination is related to the diuretics.  Follow-up in the PSA.\par \par He is compliant with diet and start exercising.  Continue current meds.  I renewed his meds

## 2022-04-03 LAB
ALBUMIN SERPL ELPH-MCNC: 4.7 G/DL
ALP BLD-CCNC: 74 U/L
ALT SERPL-CCNC: 36 U/L
ANION GAP SERPL CALC-SCNC: 17 MMOL/L
AST SERPL-CCNC: 18 U/L
BASOPHILS # BLD AUTO: 0.03 K/UL
BASOPHILS NFR BLD AUTO: 0.4 %
BILIRUB SERPL-MCNC: 0.6 MG/DL
BUN SERPL-MCNC: 13 MG/DL
CALCIUM SERPL-MCNC: 10.1 MG/DL
CHLORIDE SERPL-SCNC: 101 MMOL/L
CHOLEST SERPL-MCNC: 165 MG/DL
CO2 SERPL-SCNC: 25 MMOL/L
CREAT SERPL-MCNC: 0.8 MG/DL
EGFR: 101 ML/MIN/1.73M2
EOSINOPHIL # BLD AUTO: 0.09 K/UL
EOSINOPHIL NFR BLD AUTO: 1.2 %
ESTIMATED AVERAGE GLUCOSE: 169 MG/DL
GLUCOSE SERPL-MCNC: 126 MG/DL
HBA1C MFR BLD HPLC: 7.5 %
HCT VFR BLD CALC: 46.1 %
HDLC SERPL-MCNC: 35 MG/DL
HGB BLD-MCNC: 14.9 G/DL
IMM GRANULOCYTES NFR BLD AUTO: 0.4 %
LDLC SERPL CALC-MCNC: 94 MG/DL
LYMPHOCYTES # BLD AUTO: 2.08 K/UL
LYMPHOCYTES NFR BLD AUTO: 28.5 %
MAN DIFF?: NORMAL
MCHC RBC-ENTMCNC: 28.1 PG
MCHC RBC-ENTMCNC: 32.3 GM/DL
MCV RBC AUTO: 87 FL
MONOCYTES # BLD AUTO: 0.82 K/UL
MONOCYTES NFR BLD AUTO: 11.2 %
NEUTROPHILS # BLD AUTO: 4.24 K/UL
NEUTROPHILS NFR BLD AUTO: 58.3 %
NONHDLC SERPL-MCNC: 130 MG/DL
PLATELET # BLD AUTO: 327 K/UL
POTASSIUM SERPL-SCNC: 3.4 MMOL/L
PROT SERPL-MCNC: 7.1 G/DL
PSA SERPL-MCNC: 1.07 NG/ML
RBC # BLD: 5.3 M/UL
RBC # FLD: 14.7 %
SODIUM SERPL-SCNC: 143 MMOL/L
T3 SERPL-MCNC: 134 NG/DL
T4 SERPL-MCNC: 8.1 UG/DL
TRIGL SERPL-MCNC: 183 MG/DL
TSH SERPL-ACNC: 0.62 UIU/ML
WBC # FLD AUTO: 7.29 K/UL

## 2022-05-19 ENCOUNTER — NON-APPOINTMENT (OUTPATIENT)
Age: 62
End: 2022-05-19

## 2022-05-19 ENCOUNTER — APPOINTMENT (OUTPATIENT)
Dept: HEART AND VASCULAR | Facility: CLINIC | Age: 62
End: 2022-05-19
Payer: MEDICARE

## 2022-05-19 VITALS — DIASTOLIC BLOOD PRESSURE: 84 MMHG | SYSTOLIC BLOOD PRESSURE: 130 MMHG

## 2022-05-19 VITALS
HEART RATE: 80 BPM | BODY MASS INDEX: 33.13 KG/M2 | HEIGHT: 62 IN | TEMPERATURE: 97.6 F | SYSTOLIC BLOOD PRESSURE: 140 MMHG | DIASTOLIC BLOOD PRESSURE: 90 MMHG | WEIGHT: 180 LBS

## 2022-05-19 DIAGNOSIS — J98.4 OTHER DISORDERS OF LUNG: ICD-10-CM

## 2022-05-19 DIAGNOSIS — R94.31 ABNORMAL ELECTROCARDIOGRAM [ECG] [EKG]: ICD-10-CM

## 2022-05-19 DIAGNOSIS — I51.7 CARDIOMEGALY: ICD-10-CM

## 2022-05-19 PROCEDURE — 99214 OFFICE O/P EST MOD 30 MIN: CPT | Mod: 25

## 2022-05-19 PROCEDURE — 93306 TTE W/DOPPLER COMPLETE: CPT | Mod: 1L

## 2022-05-19 PROCEDURE — 93000 ELECTROCARDIOGRAM COMPLETE: CPT

## 2022-05-19 RX ORDER — POTASSIUM 75 MG
TABLET ORAL
Refills: 0 | Status: DISCONTINUED | COMMUNITY
End: 2022-05-19

## 2022-05-19 NOTE — PHYSICAL EXAM
[Well Developed] : well developed [Well Nourished] : well nourished [No Acute Distress] : no acute distress [Normal Conjunctiva] : normal conjunctiva [Normal Venous Pressure] : normal venous pressure [No Carotid Bruit] : no carotid bruit [Normal S1, S2] : normal S1, S2 [No Murmur] : no murmur [Clear Lung Fields] : clear lung fields [Good Air Entry] : good air entry [No Respiratory Distress] : no respiratory distress  [Soft] : abdomen soft [Non Tender] : non-tender [Normal Bowel Sounds] : normal bowel sounds [Normal Gait] : normal gait [No Edema] : no edema [No Cyanosis] : no cyanosis [No Varicosities] : no varicosities [No Rash] : no rash [Moves all extremities] : moves all extremities [Alert and Oriented] : alert and oriented

## 2022-05-22 NOTE — DISCUSSION/SUMMARY
[FreeTextEntry1] : EKG:NSR,NSST abn(no change)\par echo: mild-mod AR,LVH- normal LVEF\par BP a bit high- weight loss and continue norvasc+  chlorthalidone  +Diovan( combine to 1-320mg tab) for BP; reveiwed recent labs. LDL not at goal(is 94mg%)- in past repatha wasn't covered will try again and if covered will stop Zetia and continue repatha and get f/u labs in 2 months\par cont asa +Plavix for CAD, increase BB for better bP control\par instructions wriiten out

## 2022-07-18 ENCOUNTER — APPOINTMENT (OUTPATIENT)
Dept: PULMONOLOGY | Facility: CLINIC | Age: 62
End: 2022-07-18

## 2022-07-18 VITALS
HEIGHT: 62 IN | TEMPERATURE: 97.8 F | SYSTOLIC BLOOD PRESSURE: 160 MMHG | BODY MASS INDEX: 33.13 KG/M2 | OXYGEN SATURATION: 97 % | WEIGHT: 180 LBS | HEART RATE: 74 BPM | DIASTOLIC BLOOD PRESSURE: 92 MMHG

## 2022-07-18 VITALS — SYSTOLIC BLOOD PRESSURE: 110 MMHG | DIASTOLIC BLOOD PRESSURE: 70 MMHG

## 2022-07-18 DIAGNOSIS — I35.1 NONRHEUMATIC AORTIC (VALVE) INSUFFICIENCY: ICD-10-CM

## 2022-07-18 DIAGNOSIS — Z11.59 ENCOUNTER FOR SCREENING FOR OTHER VIRAL DISEASES: ICD-10-CM

## 2022-07-18 PROCEDURE — 36415 COLL VENOUS BLD VENIPUNCTURE: CPT

## 2022-07-18 PROCEDURE — 99213 OFFICE O/P EST LOW 20 MIN: CPT | Mod: 25

## 2022-07-18 NOTE — ASSESSMENT
[FreeTextEntry1] : Sleep apnea\par \par The patient has not been using his CPAP.  He will order the power connection today.  he was instriucted with the need to treat his JANES\par \par  Hypertension.  \par \par Continue antihypertensive meds which is tolerated well.  Blood pressure is controlled and manually was 110/70.  He is compliant with diet salt intake and started exercising.  He will be more compliant with the CPAP mask.\par \par Coronary artery disease cardiomyopathy and aortic valve insufficiency\par \par He is followed with a cardiologist.  The patient is clinically stable with no clinical evidence of decompensation at this point.  The patient is compliant with diet and salt and exercise. \par \par Obstructive lung disease\par \par He is off the Flovent and rarely used the albuterol.  PFT next visit\par \par Hyperlipidemia\par \par Improved and lost weight and is more compliant with diet and exercise\par \par Multiple nodules\par \par Is due for her yearly chest x-ray.\par \par Follow-up on the labs.  I\par \par He is compliant with diet and start exercising.  Continue current meds.  I renewed his med.\par Refer to derm for his sebaceous cyst

## 2022-07-18 NOTE — HISTORY OF PRESENT ILLNESS
[FreeTextEntry1] : he is following up [de-identified] : he is doing better  he is cutting down on portions and is eating better.  he is having oatmeal for breakfast, usually no lunch, and dinner is a smaller portion.  He is cut lot on salt and no chips.  he is walking on regualr basis and started taking the staircase up t third floor.  His breathing is better.   He is using albuterol when it is humid on average once a week.  He is not taking the flovent.  he is not snoring at night.  He is tired in am as his sleep is not good

## 2022-07-19 LAB
ALBUMIN SERPL ELPH-MCNC: 4.7 G/DL
ALP BLD-CCNC: 70 U/L
ALT SERPL-CCNC: 21 U/L
ANION GAP SERPL CALC-SCNC: 17 MMOL/L
AST SERPL-CCNC: 16 U/L
BILIRUB SERPL-MCNC: 0.3 MG/DL
BUN SERPL-MCNC: 19 MG/DL
CALCIUM SERPL-MCNC: 9.8 MG/DL
CHLORIDE SERPL-SCNC: 100 MMOL/L
CO2 SERPL-SCNC: 27 MMOL/L
CREAT SERPL-MCNC: 0.87 MG/DL
EGFR: 98 ML/MIN/1.73M2
GLUCOSE SERPL-MCNC: 81 MG/DL
POTASSIUM SERPL-SCNC: 3.4 MMOL/L
PROT SERPL-MCNC: 7.1 G/DL
SODIUM SERPL-SCNC: 144 MMOL/L

## 2022-08-10 ENCOUNTER — RX RENEWAL (OUTPATIENT)
Age: 62
End: 2022-08-10

## 2022-09-22 ENCOUNTER — APPOINTMENT (OUTPATIENT)
Dept: HEART AND VASCULAR | Facility: CLINIC | Age: 62
End: 2022-09-22

## 2022-09-22 VITALS
DIASTOLIC BLOOD PRESSURE: 92 MMHG | BODY MASS INDEX: 33.13 KG/M2 | WEIGHT: 180 LBS | HEIGHT: 62 IN | OXYGEN SATURATION: 97 % | HEART RATE: 62 BPM | TEMPERATURE: 97.2 F | SYSTOLIC BLOOD PRESSURE: 149 MMHG

## 2022-09-22 PROCEDURE — 36415 COLL VENOUS BLD VENIPUNCTURE: CPT

## 2022-09-22 PROCEDURE — 99214 OFFICE O/P EST MOD 30 MIN: CPT | Mod: 25

## 2022-09-22 PROCEDURE — 93000 ELECTROCARDIOGRAM COMPLETE: CPT

## 2022-09-22 RX ORDER — EVOLOCUMAB 140 MG/ML
140 INJECTION, SOLUTION SUBCUTANEOUS
Qty: 2 | Refills: 3 | Status: DISCONTINUED | COMMUNITY
Start: 2022-05-19 | End: 2022-09-22

## 2022-09-22 RX ORDER — FLUTICASONE PROPIONATE 50 UG/1
50 SPRAY, METERED NASAL TWICE DAILY
Qty: 1 | Refills: 3 | Status: DISCONTINUED | COMMUNITY
Start: 2021-07-20 | End: 2022-09-22

## 2022-09-22 NOTE — DISCUSSION/SUMMARY
[FreeTextEntry1] : EKG:NSR,LVH\par reviewed previous labs- will check BMP for hypokalemia- proBNP for ICM and occasional nocturnal cough\par continue Lasix + Valsartan +Toprol for ICM/BP;zetia +lipitor for lipids;norvasc for HPTN;asa +toprol for CAD.\par his BP is high but he forgot to take his meds today

## 2022-09-22 NOTE — HISTORY OF PRESENT ILLNESS
[FreeTextEntry1] : has LBP- no cp/sob- uses inhalers\par not taking repatah as too expensive- he has adjusted his diet\par

## 2022-09-23 LAB
ANION GAP SERPL CALC-SCNC: 15 MMOL/L
BUN SERPL-MCNC: 18 MG/DL
CALCIUM SERPL-MCNC: 10 MG/DL
CHLORIDE SERPL-SCNC: 102 MMOL/L
CHOLEST SERPL-MCNC: 153 MG/DL
CO2 SERPL-SCNC: 26 MMOL/L
CREAT SERPL-MCNC: 0.83 MG/DL
EGFR: 100 ML/MIN/1.73M2
GLUCOSE SERPL-MCNC: 110 MG/DL
HDLC SERPL-MCNC: 39 MG/DL
LDLC SERPL CALC-MCNC: 87 MG/DL
NONHDLC SERPL-MCNC: 113 MG/DL
NT-PROBNP SERPL-MCNC: 120 PG/ML
POTASSIUM SERPL-SCNC: 4.3 MMOL/L
SODIUM SERPL-SCNC: 143 MMOL/L
TRIGL SERPL-MCNC: 134 MG/DL

## 2022-10-10 ENCOUNTER — APPOINTMENT (OUTPATIENT)
Dept: PULMONOLOGY | Facility: CLINIC | Age: 62
End: 2022-10-10

## 2022-10-10 VITALS
DIASTOLIC BLOOD PRESSURE: 75 MMHG | SYSTOLIC BLOOD PRESSURE: 143 MMHG | OXYGEN SATURATION: 99 % | TEMPERATURE: 97.5 F | BODY MASS INDEX: 31.83 KG/M2 | WEIGHT: 173 LBS | HEIGHT: 62 IN | RESPIRATION RATE: 12 BRPM | HEART RATE: 70 BPM

## 2022-10-10 DIAGNOSIS — R05.9 COUGH, UNSPECIFIED: ICD-10-CM

## 2022-10-10 DIAGNOSIS — I42.9 CARDIOMYOPATHY, UNSPECIFIED: ICD-10-CM

## 2022-10-10 DIAGNOSIS — Z23 ENCOUNTER FOR IMMUNIZATION: ICD-10-CM

## 2022-10-10 PROCEDURE — 94010 BREATHING CAPACITY TEST: CPT

## 2022-10-10 PROCEDURE — 94729 DIFFUSING CAPACITY: CPT

## 2022-10-10 PROCEDURE — 99214 OFFICE O/P EST MOD 30 MIN: CPT | Mod: 25

## 2022-10-10 PROCEDURE — ZZZZZ: CPT

## 2022-10-10 PROCEDURE — 94727 GAS DIL/WSHOT DETER LNG VOL: CPT

## 2022-10-10 PROCEDURE — 90686 IIV4 VACC NO PRSV 0.5 ML IM: CPT

## 2022-10-10 PROCEDURE — G0008: CPT

## 2022-10-10 RX ORDER — LORAZEPAM 1 MG/1
1 TABLET ORAL
Qty: 60 | Refills: 0 | Status: ACTIVE | COMMUNITY
Start: 2018-11-23 | End: 1900-01-01

## 2022-10-10 NOTE — ASSESSMENT
[FreeTextEntry1] : Obstructive sleep apnea\par \par I discussed the case in details with the patient and indicated to him that he has to be compliant with the CPAP on daily basis due to his cardiac history.  He will be compliant on daily basis.  I also instructed the patient that he can take the lorazepam to help with his tolerance with the CPAP but he had to promise me that he will only use it if he uses the CPAP mask\par \par Hypertension\par \par The patient on multiple agents and the blood pressure is controlled\par \par Chronic artery disease with cardiomyopathy\par \par The patient is doing well he is losing weight.  His exercise capacity improved.  There is no clinical evidence of underlying decompensated CHF.  The patient is to continue on the his cardiac regimen.  The patient was followed by cardiology.  I reviewed the labs with him\par \par Hyperlipidemia\par \par The patient is on statin and the possibility that he will be shifted to the subcu injection.  The lipid profile improved from previous 1 but the HDL is low\par \par Hypokalemia\par \par The potassium level was normal and he is on potassium supplementation\par \par Multiple pulmonary nodules\par \par The patient is scheduled for chest x-ray in the next year\par \par Influenza vaccine was given on the left deltoid on tolerated well\par he will get me the last colonoscopy report\par Reviewed the PFT with the patient and consistent with moderate restrictive lung disease and mild decrease in diffusion capacity with no significant change from the previous 1

## 2022-10-10 NOTE — HEALTH RISK ASSESSMENT
[No] : No [No falls in past year] : Patient reported no falls in the past year [0] : 2) Feeling down, depressed, or hopeless: Not at all (0) [Never] : Never

## 2022-10-10 NOTE — HISTORY OF PRESENT ILLNESS
[FreeTextEntry1] : he is following up [de-identified] : Very well.  He is watching what he eats.  He is not using CPAP on regular basis.  He is watching his salt intake.  He has follow-up with cardiology.  He had a recent blood work.  He is walking on daily basis.  He walks the dog about 5 times a day and for long distance every time he has no shortness of breath no wheezing.  He rarely uses the albuterol.  He has a postnasal drip and is coughing again

## 2022-12-20 ENCOUNTER — NON-APPOINTMENT (OUTPATIENT)
Age: 62
End: 2022-12-20

## 2022-12-20 ENCOUNTER — APPOINTMENT (OUTPATIENT)
Dept: HEART AND VASCULAR | Facility: CLINIC | Age: 62
End: 2022-12-20

## 2022-12-20 VITALS
HEIGHT: 62 IN | OXYGEN SATURATION: 98 % | DIASTOLIC BLOOD PRESSURE: 94 MMHG | WEIGHT: 178 LBS | TEMPERATURE: 97.8 F | BODY MASS INDEX: 32.76 KG/M2 | SYSTOLIC BLOOD PRESSURE: 160 MMHG | HEART RATE: 71 BPM

## 2022-12-20 PROCEDURE — 93000 ELECTROCARDIOGRAM COMPLETE: CPT

## 2022-12-20 PROCEDURE — 36415 COLL VENOUS BLD VENIPUNCTURE: CPT

## 2022-12-20 PROCEDURE — 99214 OFFICE O/P EST MOD 30 MIN: CPT | Mod: 25

## 2022-12-20 RX ORDER — SILDENAFIL 50 MG/1
50 TABLET ORAL
Qty: 10 | Refills: 0 | Status: DISCONTINUED | COMMUNITY
Start: 2022-03-20 | End: 2022-12-20

## 2022-12-20 RX ORDER — FLUTICASONE PROPIONATE 0.5 MG/G
0.05 CREAM TOPICAL TWICE DAILY
Qty: 1 | Refills: 3 | Status: DISCONTINUED | COMMUNITY
Start: 2022-10-10 | End: 2022-12-20

## 2022-12-20 NOTE — HISTORY OF PRESENT ILLNESS
[FreeTextEntry1] : for last several weeks noticed increased duque- stairs, especially carrying packages\par no PND/orthopnea- gets occasional chest pinching at rest

## 2022-12-20 NOTE — DISCUSSION/SUMMARY
[FreeTextEntry1] : EKG:NSR,T wave abn\par BP high- will increase chlorthalidone  and continue norvasc + valsartan for BP; check labs- get TTE for MONK and get CCTA for CP/MONK \par continue Praluent +lipitor +Zetia for HLD( was supposed to stop Zetia) check labs

## 2022-12-21 ENCOUNTER — TRANSCRIPTION ENCOUNTER (OUTPATIENT)
Age: 62
End: 2022-12-21

## 2022-12-21 DIAGNOSIS — R73.9 HYPERGLYCEMIA, UNSPECIFIED: ICD-10-CM

## 2022-12-21 LAB
ALBUMIN SERPL ELPH-MCNC: 4.6 G/DL
ALP BLD-CCNC: 77 U/L
ALT SERPL-CCNC: 29 U/L
ANION GAP SERPL CALC-SCNC: 14 MMOL/L
AST SERPL-CCNC: 25 U/L
BILIRUB SERPL-MCNC: 0.5 MG/DL
BUN SERPL-MCNC: 16 MG/DL
CALCIUM SERPL-MCNC: 10.4 MG/DL
CHLORIDE SERPL-SCNC: 98 MMOL/L
CHOLEST SERPL-MCNC: 84 MG/DL
CO2 SERPL-SCNC: 32 MMOL/L
CREAT SERPL-MCNC: 1.04 MG/DL
EGFR: 82 ML/MIN/1.73M2
GLUCOSE SERPL-MCNC: 122 MG/DL
HCT VFR BLD CALC: 43.7 %
HDLC SERPL-MCNC: 37 MG/DL
HGB BLD-MCNC: 15.6 G/DL
LDLC SERPL CALC-MCNC: 14 MG/DL
NONHDLC SERPL-MCNC: 46 MG/DL
NT-PROBNP SERPL-MCNC: 141 PG/ML
POTASSIUM SERPL-SCNC: 3.8 MMOL/L
PROT SERPL-MCNC: 7.7 G/DL
SODIUM SERPL-SCNC: 144 MMOL/L
TRIGL SERPL-MCNC: 162 MG/DL

## 2022-12-21 RX ORDER — EZETIMIBE 10 MG/1
10 TABLET ORAL
Qty: 90 | Refills: 3 | Status: DISCONTINUED | COMMUNITY
Start: 2022-03-29 | End: 2022-12-21

## 2023-01-05 ENCOUNTER — APPOINTMENT (OUTPATIENT)
Dept: HEART AND VASCULAR | Facility: CLINIC | Age: 63
End: 2023-01-05
Payer: MEDICARE

## 2023-01-05 VITALS
HEIGHT: 62 IN | HEART RATE: 70 BPM | WEIGHT: 178 LBS | DIASTOLIC BLOOD PRESSURE: 81 MMHG | SYSTOLIC BLOOD PRESSURE: 120 MMHG | OXYGEN SATURATION: 96 % | BODY MASS INDEX: 32.76 KG/M2

## 2023-01-05 DIAGNOSIS — I35.1 NONRHEUMATIC AORTIC (VALVE) INSUFFICIENCY: ICD-10-CM

## 2023-01-05 DIAGNOSIS — R06.02 SHORTNESS OF BREATH: ICD-10-CM

## 2023-01-05 PROBLEM — I51.7 LVH (LEFT VENTRICULAR HYPERTROPHY): Status: ACTIVE | Noted: 2023-01-05

## 2023-01-05 PROBLEM — J98.4 PULMONARY INSUFFICIENCY: Status: ACTIVE | Noted: 2018-05-02

## 2023-01-05 PROCEDURE — 93306 TTE W/DOPPLER COMPLETE: CPT

## 2023-01-05 PROCEDURE — 93000 ELECTROCARDIOGRAM COMPLETE: CPT

## 2023-01-05 PROCEDURE — 99214 OFFICE O/P EST MOD 30 MIN: CPT | Mod: 25

## 2023-01-05 RX ORDER — FUROSEMIDE 40 MG/1
40 TABLET ORAL DAILY
Qty: 90 | Refills: 0 | Status: ACTIVE | COMMUNITY
Start: 2020-11-12 | End: 1900-01-01

## 2023-01-05 NOTE — DISCUSSION/SUMMARY
[FreeTextEntry1] : EKG:NSR,LVH\par TTE:mild-mod AR,ID\par needs labs in 1-2 month for lipids\par continue lipitor +Praluent for HLD; Plavix +Toprol for CAD,norvasc for HPTN\par meds refilled

## 2023-01-19 ENCOUNTER — APPOINTMENT (OUTPATIENT)
Dept: PULMONOLOGY | Facility: CLINIC | Age: 63
End: 2023-01-19
Payer: MEDICARE

## 2023-01-19 VITALS
SYSTOLIC BLOOD PRESSURE: 121 MMHG | WEIGHT: 178 LBS | BODY MASS INDEX: 32.76 KG/M2 | OXYGEN SATURATION: 92 % | HEIGHT: 62 IN | HEART RATE: 89 BPM | DIASTOLIC BLOOD PRESSURE: 83 MMHG

## 2023-01-19 VITALS — OXYGEN SATURATION: 97 %

## 2023-01-19 DIAGNOSIS — Z00.00 ENCOUNTER FOR GENERAL ADULT MEDICAL EXAMINATION W/OUT ABNORMAL FINDINGS: ICD-10-CM

## 2023-01-19 DIAGNOSIS — J98.4 OTHER DISORDERS OF LUNG: ICD-10-CM

## 2023-01-19 PROCEDURE — 99214 OFFICE O/P EST MOD 30 MIN: CPT

## 2023-01-19 NOTE — HEALTH RISK ASSESSMENT
[Never] : Never [No] : No [1 or 2 (0 pts)] : 1 or 2 (0 points) [Never (0 pts)] : Never (0 points) [No falls in past year] : Patient reported no falls in the past year [0] : 2) Feeling down, depressed, or hopeless: Not at all (0) [Audit-CScore] : 0 [[id number]] : 0

## 2023-01-19 NOTE — HISTORY OF PRESENT ILLNESS
[FreeTextEntry1] : he is following up [de-identified] : Very well.  He is watching what he eats and his portions.  He watches his salt intake and his sugar.  Denies SOB, chest pain, wheezing.  He had had occasional cough and was seen at urgent care told allergy related started Claritin which helps.  He had his back cyst removed by dermatology.  He is using the CPAP on regular basis approx 8 hours.  He is comfortable with the pressure.  He has trouble falling asleep and takes sleeping pills as needed.  He has follow-up with cardiology.  He is walking on daily basis.  He walks the dog about 5 times a day and for long distance every time he has no shortness of breath no wheezing.  He rarely uses the albuterol.  He is having joint pain in knees, wrist and back. He is following pain management and getting injections.

## 2023-01-19 NOTE — ASSESSMENT
[FreeTextEntry1] : Obstructive sleep apnea\par \par I discussed the case in details with the patient and indicated to him that he has to be compliant with the CPAP on daily basis due to his cardiac history.  He will be compliant on daily basis.  I also instructed the patient that he can take the lorazepam to help with his tolerance with the CPAP but he had to promise me that he will only use it if he uses the CPAP mask\par \par Hypertension\par \par The patient on multiple agents and the blood pressure is controlled\par \par Chronic artery disease with cardiomyopathy\par \par The patient is doing well his weight is stable.  His exercise capacity improved.  There is no clinical evidence of underlying decompensated CHF.  The patient is to continue on the his cardiac regimen.  The patient was followed by cardiology.  I reviewed the labs with him, will follow on additional lab work.\par \par Hyperlipidemia\par \par pt HLD being managed by cardiology, last lab work his panel was low and decreased his medications. \par \par Hypokalemia\par \par The potassium level was normal and he is on potassium supplementation\par \par Multiple pulmonary nodules\par \par The patient is scheduled for chest x-ray in the next year.  Scheduled for March 2023.\par \par Up to date with flu vaccine.\par he will get me the last colonoscopy report\par Reviewed the PFT with the patient and consistent with moderate restrictive lung disease and mild decrease in diffusion capacity with no significant change from the previous 1.

## 2023-01-23 ENCOUNTER — TRANSCRIPTION ENCOUNTER (OUTPATIENT)
Age: 63
End: 2023-01-23

## 2023-01-23 LAB
BASOPHILS # BLD AUTO: 0.05 K/UL
BASOPHILS NFR BLD AUTO: 0.6 %
EOSINOPHIL # BLD AUTO: 0.29 K/UL
EOSINOPHIL NFR BLD AUTO: 3.4 %
ESTIMATED AVERAGE GLUCOSE: 143 MG/DL
HBA1C MFR BLD HPLC: 6.6 %
HCT VFR BLD CALC: 46.1 %
HGB BLD-MCNC: 15.3 G/DL
IMM GRANULOCYTES NFR BLD AUTO: 0.1 %
LYMPHOCYTES # BLD AUTO: 2.12 K/UL
LYMPHOCYTES NFR BLD AUTO: 25.2 %
MAN DIFF?: NORMAL
MCHC RBC-ENTMCNC: 28.1 PG
MCHC RBC-ENTMCNC: 33.2 GM/DL
MCV RBC AUTO: 84.7 FL
MONOCYTES # BLD AUTO: 0.78 K/UL
MONOCYTES NFR BLD AUTO: 9.3 %
NEUTROPHILS # BLD AUTO: 5.16 K/UL
NEUTROPHILS NFR BLD AUTO: 61.4 %
PLATELET # BLD AUTO: 338 K/UL
PSA SERPL-MCNC: 0.95 NG/ML
RBC # BLD: 5.44 M/UL
RBC # FLD: 14.8 %
T3 SERPL-MCNC: 132 NG/DL
T4 SERPL-MCNC: 7.6 UG/DL
TSH SERPL-ACNC: 2.35 UIU/ML
WBC # FLD AUTO: 8.41 K/UL

## 2023-01-28 LAB — 25(OH)D3 SERPL-MCNC: 34.3 NG/ML

## 2023-02-01 ENCOUNTER — OUTPATIENT (OUTPATIENT)
Dept: OUTPATIENT SERVICES | Facility: HOSPITAL | Age: 63
LOS: 1 days | End: 2023-02-01

## 2023-02-01 ENCOUNTER — APPOINTMENT (OUTPATIENT)
Dept: CT IMAGING | Facility: CLINIC | Age: 63
End: 2023-02-01
Payer: MEDICARE

## 2023-02-01 PROCEDURE — 75574 CT ANGIO HRT W/3D IMAGE: CPT | Mod: 26

## 2023-03-02 ENCOUNTER — RX RENEWAL (OUTPATIENT)
Age: 63
End: 2023-03-02

## 2023-03-23 ENCOUNTER — RX RENEWAL (OUTPATIENT)
Age: 63
End: 2023-03-23

## 2023-03-24 ENCOUNTER — RX RENEWAL (OUTPATIENT)
Age: 63
End: 2023-03-24

## 2023-05-11 ENCOUNTER — OUTPATIENT (OUTPATIENT)
Dept: OUTPATIENT SERVICES | Facility: HOSPITAL | Age: 63
LOS: 1 days | End: 2023-05-11
Payer: MEDICARE

## 2023-05-11 ENCOUNTER — APPOINTMENT (OUTPATIENT)
Dept: PULMONOLOGY | Facility: CLINIC | Age: 63
End: 2023-05-11
Payer: MEDICARE

## 2023-05-11 VITALS
BODY MASS INDEX: 33.13 KG/M2 | TEMPERATURE: 96.6 F | WEIGHT: 180 LBS | SYSTOLIC BLOOD PRESSURE: 135 MMHG | HEIGHT: 62 IN | HEART RATE: 69 BPM | DIASTOLIC BLOOD PRESSURE: 84 MMHG | OXYGEN SATURATION: 98 %

## 2023-05-11 DIAGNOSIS — J30.9 ALLERGIC RHINITIS, UNSPECIFIED: ICD-10-CM

## 2023-05-11 PROCEDURE — 99213 OFFICE O/P EST LOW 20 MIN: CPT

## 2023-05-11 PROCEDURE — 71046 X-RAY EXAM CHEST 2 VIEWS: CPT

## 2023-05-11 PROCEDURE — 71046 X-RAY EXAM CHEST 2 VIEWS: CPT | Mod: 26

## 2023-05-11 NOTE — REVIEW OF SYSTEMS
[Postnasal Drip] : postnasal drip [Nasal Discharge] : nasal discharge [Cough] : cough [Negative] : Heme/Lymph [Earache] : no earache [Hearing Loss] : no hearing loss [Nosebleeds] : no nosebleeds [Sore Throat] : no sore throat [Hoarseness] : no hoarseness [Shortness Of Breath] : no shortness of breath [Wheezing] : no wheezing [Dyspnea on Exertion] : not dyspnea on exertion

## 2023-05-11 NOTE — PHYSICAL EXAM
[No Acute Distress] : no acute distress [Well Nourished] : well nourished [Well Developed] : well developed [Well-Appearing] : well-appearing [Normal Sclera/Conjunctiva] : normal sclera/conjunctiva [PERRL] : pupils equal round and reactive to light [EOMI] : extraocular movements intact [Normal Outer Ear/Nose] : the outer ears and nose were normal in appearance [Normal Oropharynx] : the oropharynx was normal [Normal] : the outer ears and nose were normal in appearance and the oropharynx was normal [No JVD] : no jugular venous distention [No Lymphadenopathy] : no lymphadenopathy [Supple] : supple [Thyroid Normal, No Nodules] : the thyroid was normal and there were no nodules present [No Respiratory Distress] : no respiratory distress  [No Accessory Muscle Use] : no accessory muscle use [Clear to Auscultation] : lungs were clear to auscultation bilaterally [Normal Rate] : normal rate  [Regular Rhythm] : with a regular rhythm [Normal S1, S2] : normal S1 and S2 [No Murmur] : no murmur heard [No Carotid Bruits] : no carotid bruits [No Abdominal Bruit] : a ~M bruit was not heard ~T in the abdomen [No Varicosities] : no varicosities [Pedal Pulses Present] : the pedal pulses are present [No Edema] : there was no peripheral edema [No Palpable Aorta] : no palpable aorta [No Extremity Clubbing/Cyanosis] : no extremity clubbing/cyanosis [Soft] : abdomen soft [Non Tender] : non-tender [Non-distended] : non-distended [No Masses] : no abdominal mass palpated [No HSM] : no HSM [Normal Bowel Sounds] : normal bowel sounds [Normal Posterior Cervical Nodes] : no posterior cervical lymphadenopathy [Normal Anterior Cervical Nodes] : no anterior cervical lymphadenopathy [No CVA Tenderness] : no CVA  tenderness [No Spinal Tenderness] : no spinal tenderness [No Joint Swelling] : no joint swelling [Grossly Normal Strength/Tone] : grossly normal strength/tone [No Rash] : no rash [Coordination Grossly Intact] : coordination grossly intact [No Focal Deficits] : no focal deficits [Normal Gait] : normal gait [Deep Tendon Reflexes (DTR)] : deep tendon reflexes were 2+ and symmetric [Normal Affect] : the affect was normal [Normal Insight/Judgement] : insight and judgment were intact [de-identified] : nasal congestion

## 2023-05-11 NOTE — HISTORY OF PRESENT ILLNESS
[FreeTextEntry1] : following on his BP [de-identified] : he was doing well until the rain and the cough came back.  The cough is dry.  No runny nose.  he has a tckle in the throat.  Sometimes the sputum is stuck .   he is not sob and is walking the dog.  No wheezing.

## 2023-05-11 NOTE — ASSESSMENT
[FreeTextEntry1] : Allergic rhinitis\par \par The cough is related to the postnasal drip.  Started the patient on Flonase and Claritin patient will we will follow-up on the chest x-ray\par \par Hypertension\par \par The blood pressure is controlled and he is to continue on the current regimen\par \par Coronary artery disease\par \par The patient has follow-up with cardiology no clinical evidence neither CHF no arrhythmia\par \par Hyperlipidemia\par \par He is losing weight and compliant with diet.  Continue on statin

## 2023-05-22 ENCOUNTER — RX RENEWAL (OUTPATIENT)
Age: 63
End: 2023-05-22

## 2023-05-25 RX ORDER — POTASSIUM CHLORIDE 750 MG/1
10 TABLET, FILM COATED, EXTENDED RELEASE ORAL 3 TIMES DAILY
Qty: 1 | Refills: 0 | Status: ACTIVE | COMMUNITY
Start: 2020-08-14 | End: 1900-01-01

## 2023-06-22 ENCOUNTER — RX RENEWAL (OUTPATIENT)
Age: 63
End: 2023-06-22

## 2023-06-29 ENCOUNTER — APPOINTMENT (OUTPATIENT)
Dept: HEART AND VASCULAR | Facility: CLINIC | Age: 63
End: 2023-06-29
Payer: MEDICARE

## 2023-06-29 ENCOUNTER — NON-APPOINTMENT (OUTPATIENT)
Age: 63
End: 2023-06-29

## 2023-06-29 VITALS
WEIGHT: 183 LBS | HEART RATE: 70 BPM | SYSTOLIC BLOOD PRESSURE: 128 MMHG | DIASTOLIC BLOOD PRESSURE: 78 MMHG | BODY MASS INDEX: 33.68 KG/M2 | HEIGHT: 62 IN

## 2023-06-29 DIAGNOSIS — R07.9 CHEST PAIN, UNSPECIFIED: ICD-10-CM

## 2023-06-29 PROCEDURE — 99214 OFFICE O/P EST MOD 30 MIN: CPT | Mod: 25

## 2023-06-29 PROCEDURE — 93000 ELECTROCARDIOGRAM COMPLETE: CPT

## 2023-06-29 NOTE — HISTORY OF PRESENT ILLNESS
[FreeTextEntry1] : still with his chest pinching/shock- mostly at rest,'just happens" lasts a few seconds\par no sob

## 2023-06-29 NOTE — DISCUSSION/SUMMARY
[FreeTextEntry1] : EKG:NSR,ST/T wave abn\par reviewed CCTA results- do not feel CP is ischemic but as has residual CAD- will try Ranexa and he will call me if Sx improve\par continue Toprol + asa +Plavix for cAD;Lipitor for HLD;Norvasc + valsartan for HPTN\par gave RX for labs\par needs to lose weight

## 2023-07-28 ENCOUNTER — RX RENEWAL (OUTPATIENT)
Age: 63
End: 2023-07-28

## 2023-07-28 RX ORDER — CHLORTHALIDONE 50 MG/1
50 TABLET ORAL DAILY
Qty: 90 | Refills: 1 | Status: ACTIVE | COMMUNITY
Start: 2020-04-27 | End: 1900-01-01

## 2023-08-07 ENCOUNTER — APPOINTMENT (OUTPATIENT)
Dept: PULMONOLOGY | Facility: CLINIC | Age: 63
End: 2023-08-07

## 2023-08-15 ENCOUNTER — APPOINTMENT (OUTPATIENT)
Dept: PULMONOLOGY | Facility: CLINIC | Age: 63
End: 2023-08-15
Payer: MEDICARE

## 2023-08-15 VITALS
BODY MASS INDEX: 33.13 KG/M2 | TEMPERATURE: 97.4 F | OXYGEN SATURATION: 99 % | SYSTOLIC BLOOD PRESSURE: 137 MMHG | DIASTOLIC BLOOD PRESSURE: 84 MMHG | HEART RATE: 65 BPM | WEIGHT: 180 LBS | HEIGHT: 62 IN

## 2023-08-15 PROCEDURE — 99213 OFFICE O/P EST LOW 20 MIN: CPT | Mod: 25

## 2023-08-15 RX ORDER — SILDENAFIL 50 MG/1
50 TABLET ORAL
Qty: 30 | Refills: 1 | Status: ACTIVE | COMMUNITY
Start: 2023-08-15 | End: 1900-01-01

## 2023-08-15 NOTE — HISTORY OF PRESENT ILLNESS
[FreeTextEntry1] : following on HTN and asthma [de-identified] : he is doing well.  He is complaint with the cpap and complains of dry itching throat.  he tried the flonase and water.  He gags anytime he eats and would cough and sometimes piece of food comes up.  The voice is the same.  The breathing feels heavy lately with the change with the weather

## 2023-08-15 NOTE — ASSESSMENT
[FreeTextEntry1] : Allergic rhinitis  The cough is related to the postnasal drip.  Started the patient on Flonase and Claritin   Hypertension  The blood pressure is controlled, and he is to continue on the current regimen.  Coronary artery disease  The patient has follow-up with cardiology no clinical evidence neither CHF no arrhythmia.  Hyperlipidemia  He is losing weight and compliant with diet.  Continue on statin.  Follow on labs and he is complaint with diet.

## 2023-08-16 LAB
25(OH)D3 SERPL-MCNC: 37.1 NG/ML
ALBUMIN SERPL ELPH-MCNC: 4.7 G/DL
ALP BLD-CCNC: 73 U/L
ALT SERPL-CCNC: 25 U/L
ANION GAP SERPL CALC-SCNC: 14 MMOL/L
AST SERPL-CCNC: 19 U/L
BILIRUB SERPL-MCNC: 0.4 MG/DL
BUN SERPL-MCNC: 14 MG/DL
CALCIUM SERPL-MCNC: 10.4 MG/DL
CHLORIDE SERPL-SCNC: 100 MMOL/L
CHOLEST SERPL-MCNC: 104 MG/DL
CO2 SERPL-SCNC: 26 MMOL/L
CREAT SERPL-MCNC: 0.91 MG/DL
EGFR: 95 ML/MIN/1.73M2
ESTIMATED AVERAGE GLUCOSE: 137 MG/DL
GLUCOSE SERPL-MCNC: 91 MG/DL
HBA1C MFR BLD HPLC: 6.4 %
HDLC SERPL-MCNC: 40 MG/DL
LDLC SERPL CALC-MCNC: 36 MG/DL
NONHDLC SERPL-MCNC: 64 MG/DL
POTASSIUM SERPL-SCNC: 3.7 MMOL/L
PROT SERPL-MCNC: 7.4 G/DL
SODIUM SERPL-SCNC: 140 MMOL/L
TRIGL SERPL-MCNC: 175 MG/DL
TSH SERPL-ACNC: 1.46 UIU/ML

## 2023-09-26 ENCOUNTER — TRANSCRIPTION ENCOUNTER (OUTPATIENT)
Age: 63
End: 2023-09-26

## 2023-12-06 ENCOUNTER — APPOINTMENT (OUTPATIENT)
Dept: PULMONOLOGY | Facility: CLINIC | Age: 63
End: 2023-12-06
Payer: MEDICARE

## 2023-12-06 VITALS
SYSTOLIC BLOOD PRESSURE: 154 MMHG | HEIGHT: 62 IN | WEIGHT: 178 LBS | TEMPERATURE: 98.1 F | BODY MASS INDEX: 32.76 KG/M2 | DIASTOLIC BLOOD PRESSURE: 91 MMHG

## 2023-12-06 DIAGNOSIS — R91.8 OTHER NONSPECIFIC ABNORMAL FINDING OF LUNG FIELD: ICD-10-CM

## 2023-12-06 PROCEDURE — 99213 OFFICE O/P EST LOW 20 MIN: CPT

## 2023-12-06 RX ORDER — MONTELUKAST 10 MG/1
10 TABLET, FILM COATED ORAL
Qty: 30 | Refills: 11 | Status: ACTIVE | COMMUNITY
Start: 2022-06-02 | End: 1900-01-01

## 2023-12-06 RX ORDER — ALIROCUMAB 75 MG/ML
75 INJECTION, SOLUTION SUBCUTANEOUS
Qty: 3 | Refills: 1 | Status: DISCONTINUED | COMMUNITY
Start: 2020-11-19 | End: 2023-12-06

## 2023-12-06 RX ORDER — QUETIAPINE FUMARATE 25 MG/1
25 TABLET ORAL
Qty: 30 | Refills: 11 | Status: ACTIVE | COMMUNITY
Start: 2021-09-07 | End: 1900-01-01

## 2024-01-02 ENCOUNTER — APPOINTMENT (OUTPATIENT)
Dept: HEART AND VASCULAR | Facility: CLINIC | Age: 64
End: 2024-01-02
Payer: MEDICARE

## 2024-01-02 ENCOUNTER — RX RENEWAL (OUTPATIENT)
Age: 64
End: 2024-01-02

## 2024-01-02 VITALS
BODY MASS INDEX: 32.76 KG/M2 | WEIGHT: 178 LBS | DIASTOLIC BLOOD PRESSURE: 99 MMHG | HEIGHT: 62 IN | HEART RATE: 60 BPM | SYSTOLIC BLOOD PRESSURE: 164 MMHG

## 2024-01-02 VITALS — SYSTOLIC BLOOD PRESSURE: 144 MMHG | DIASTOLIC BLOOD PRESSURE: 80 MMHG

## 2024-01-02 DIAGNOSIS — E87.6 HYPOKALEMIA: ICD-10-CM

## 2024-01-02 DIAGNOSIS — R73.09 OTHER ABNORMAL GLUCOSE: ICD-10-CM

## 2024-01-02 DIAGNOSIS — I25.5 ISCHEMIC CARDIOMYOPATHY: ICD-10-CM

## 2024-01-02 DIAGNOSIS — I10 ESSENTIAL (PRIMARY) HYPERTENSION: ICD-10-CM

## 2024-01-02 DIAGNOSIS — R06.09 OTHER FORMS OF DYSPNEA: ICD-10-CM

## 2024-01-02 PROCEDURE — 93000 ELECTROCARDIOGRAM COMPLETE: CPT

## 2024-01-02 PROCEDURE — 99214 OFFICE O/P EST MOD 30 MIN: CPT | Mod: 25

## 2024-01-02 PROCEDURE — 36415 COLL VENOUS BLD VENIPUNCTURE: CPT

## 2024-01-02 RX ORDER — METOPROLOL SUCCINATE 100 MG/1
100 TABLET, EXTENDED RELEASE ORAL DAILY
Qty: 1 | Refills: 1 | Status: ACTIVE | COMMUNITY
Start: 1900-01-01 | End: 1900-01-01

## 2024-01-02 RX ORDER — AZELASTINE HYDROCHLORIDE 137 UG/1
0.1 SPRAY, METERED NASAL TWICE DAILY
Qty: 1 | Refills: 3 | Status: DISCONTINUED | COMMUNITY
Start: 2023-05-14 | End: 2024-01-02

## 2024-01-02 RX ORDER — AMLODIPINE BESYLATE 10 MG/1
10 TABLET ORAL DAILY
Qty: 90 | Refills: 1 | Status: ACTIVE | COMMUNITY
Start: 1900-01-01 | End: 1900-01-01

## 2024-01-02 RX ORDER — FLUTICASONE PROPIONATE 50 UG/1
50 SPRAY, METERED NASAL TWICE DAILY
Qty: 16 | Refills: 11 | Status: DISCONTINUED | COMMUNITY
Start: 2023-05-14 | End: 2024-01-02

## 2024-01-02 RX ORDER — CLOPIDOGREL BISULFATE 75 MG/1
75 TABLET, FILM COATED ORAL DAILY
Qty: 1 | Refills: 1 | Status: ACTIVE | COMMUNITY
Start: 1900-01-01 | End: 1900-01-01

## 2024-01-02 NOTE — DISCUSSION/SUMMARY
[FreeTextEntry1] : EKG:NSR,ST abn duque could be due to CHF/ ICM- will check bnp and get lexiscan continue Lipitor for HLD BP a bit high but admits to having a lot of salt - advised to reduce and continue Toprol + valsartan +lasix for CHF/ICM;plavix + ranexa for CAD; valsartan +chlorthalidone for HPTN;kcl for hypokalemia.

## 2024-01-03 LAB
ALBUMIN SERPL ELPH-MCNC: 4.3 G/DL
ALP BLD-CCNC: 73 U/L
ALT SERPL-CCNC: 22 U/L
ANION GAP SERPL CALC-SCNC: 14 MMOL/L
AST SERPL-CCNC: 17 U/L
BILIRUB SERPL-MCNC: 0.2 MG/DL
BUN SERPL-MCNC: 16 MG/DL
CALCIUM SERPL-MCNC: 10.1 MG/DL
CHLORIDE SERPL-SCNC: 100 MMOL/L
CHOLEST SERPL-MCNC: 173 MG/DL
CO2 SERPL-SCNC: 30 MMOL/L
CREAT SERPL-MCNC: 0.86 MG/DL
EGFR: 98 ML/MIN/1.73M2
ESTIMATED AVERAGE GLUCOSE: 137 MG/DL
GLUCOSE SERPL-MCNC: 193 MG/DL
HBA1C MFR BLD HPLC: 6.4 %
HDLC SERPL-MCNC: 32 MG/DL
LDLC SERPL CALC-MCNC: 100 MG/DL
NONHDLC SERPL-MCNC: 141 MG/DL
NT-PROBNP SERPL-MCNC: 69 PG/ML
POTASSIUM SERPL-SCNC: 3.4 MMOL/L
PROT SERPL-MCNC: 7 G/DL
SODIUM SERPL-SCNC: 143 MMOL/L
TRIGL SERPL-MCNC: 239 MG/DL
TSH SERPL-ACNC: 2.16 UIU/ML

## 2024-01-03 RX ORDER — POTASSIUM CHLORIDE 1500 MG/1
20 TABLET, EXTENDED RELEASE ORAL
Qty: 90 | Refills: 1 | Status: ACTIVE | COMMUNITY
Start: 2022-08-10 | End: 1900-01-01

## 2024-01-10 ENCOUNTER — APPOINTMENT (OUTPATIENT)
Dept: HEART AND VASCULAR | Facility: CLINIC | Age: 64
End: 2024-01-10
Payer: MEDICARE

## 2024-01-10 VITALS
BODY MASS INDEX: 32.76 KG/M2 | DIASTOLIC BLOOD PRESSURE: 87 MMHG | HEART RATE: 70 BPM | WEIGHT: 178 LBS | OXYGEN SATURATION: 97 % | HEIGHT: 62 IN | SYSTOLIC BLOOD PRESSURE: 138 MMHG

## 2024-01-10 DIAGNOSIS — I35.1 NONRHEUMATIC AORTIC (VALVE) INSUFFICIENCY: ICD-10-CM

## 2024-01-10 PROCEDURE — 93306 TTE W/DOPPLER COMPLETE: CPT

## 2024-01-22 ENCOUNTER — APPOINTMENT (OUTPATIENT)
Dept: INTERNAL MEDICINE | Facility: CLINIC | Age: 64
End: 2024-01-22
Payer: MEDICARE

## 2024-01-22 VITALS
WEIGHT: 179 LBS | HEART RATE: 60 BPM | BODY MASS INDEX: 32.94 KG/M2 | SYSTOLIC BLOOD PRESSURE: 146 MMHG | TEMPERATURE: 98.2 F | HEIGHT: 62 IN | DIASTOLIC BLOOD PRESSURE: 82 MMHG | OXYGEN SATURATION: 95 %

## 2024-01-22 DIAGNOSIS — N52.9 MALE ERECTILE DYSFUNCTION, UNSPECIFIED: ICD-10-CM

## 2024-01-22 DIAGNOSIS — Z12.11 ENCOUNTER FOR SCREENING FOR MALIGNANT NEOPLASM OF COLON: ICD-10-CM

## 2024-01-22 PROCEDURE — 90480 ADMN SARSCOV2 VAC 1/ONLY CMP: CPT

## 2024-01-22 PROCEDURE — 91322 SARSCOV2 VAC 50 MCG/0.5ML IM: CPT

## 2024-01-22 PROCEDURE — 99386 PREV VISIT NEW AGE 40-64: CPT | Mod: 25,GY

## 2024-01-22 RX ORDER — ALBUTEROL SULFATE 90 UG/1
108 (90 BASE) INHALANT RESPIRATORY (INHALATION)
Qty: 1 | Refills: 6 | Status: ACTIVE | COMMUNITY
Start: 2021-04-01 | End: 1900-01-01

## 2024-01-22 RX ORDER — AZELASTINE HYDROCHLORIDE AND FLUTICASONE PROPIONATE 137; 50 UG/1; UG/1
137-50 SPRAY, METERED NASAL
Qty: 30 | Refills: 4 | Status: COMPLETED | COMMUNITY
Start: 2023-05-11 | End: 2024-01-22

## 2024-01-22 RX ORDER — FEXOFENADINE HYDROCHLORIDE 180 MG/1
180 TABLET ORAL
Qty: 30 | Refills: 0 | Status: ACTIVE | COMMUNITY
Start: 2024-01-22 | End: 1900-01-01

## 2024-01-22 NOTE — PHYSICAL EXAM
[No Acute Distress] : no acute distress [Well Nourished] : well nourished [Well Developed] : well developed [Normal Sclera/Conjunctiva] : normal sclera/conjunctiva [Normal Outer Ear/Nose] : the outer ears and nose were normal in appearance [No Respiratory Distress] : no respiratory distress  [No JVD] : no jugular venous distention [No Accessory Muscle Use] : no accessory muscle use [Clear to Auscultation] : lungs were clear to auscultation bilaterally [Regular Rhythm] : with a regular rhythm [Normal Rate] : normal rate  [Normal S1, S2] : normal S1 and S2 [No Murmur] : no murmur heard

## 2024-01-22 NOTE — HISTORY OF PRESENT ILLNESS
[de-identified] : Mr. CAM is a63 year M with PMH o HTN, CAD s/p CABG, ED, DM and allergic rhinitis who comes to establish care. No complaints today. Refer difficulty losing weight, was Dx with 2 y/a. BP also difficult to control, pt with chronic hypokalemia. Would like the COVID shot

## 2024-01-22 NOTE — REVIEW OF SYSTEMS
[Fever] : no fever [Chills] : no chills [Fatigue] : no fatigue [Recent Change In Weight] : ~T no recent weight change [Night Sweats] : no night sweats [Chest Pain] : no chest pain [Palpitations] : no palpitations [Orthopena] : no orthopnea [Lower Ext Edema] : no lower extremity edema [Shortness Of Breath] : no shortness of breath [Wheezing] : no wheezing [Cough] : no cough [Dyspnea on Exertion] : not dyspnea on exertion [Abdominal Pain] : no abdominal pain [Nausea] : no nausea [Diarrhea] : no diarrhea [Constipation] : no constipation [Vomiting] : no vomiting [Dysuria] : no dysuria [Heartburn] : no heartburn [Incontinence] : no incontinence [Nocturia] : nocturia [Hematuria] : no hematuria

## 2024-01-24 LAB
ANION GAP SERPL CALC-SCNC: 16 MMOL/L
BUN SERPL-MCNC: 18 MG/DL
CALCIUM SERPL-MCNC: 10.1 MG/DL
CHLORIDE SERPL-SCNC: 98 MMOL/L
CO2 SERPL-SCNC: 26 MMOL/L
CREAT SERPL-MCNC: 0.86 MG/DL
EGFR: 98 ML/MIN/1.73M2
GLUCOSE SERPL-MCNC: 119 MG/DL
POTASSIUM SERPL-SCNC: 3.7 MMOL/L
PSA SERPL-MCNC: 1.12 NG/ML
SODIUM SERPL-SCNC: 141 MMOL/L

## 2024-02-01 ENCOUNTER — NON-APPOINTMENT (OUTPATIENT)
Age: 64
End: 2024-02-01

## 2024-02-01 ENCOUNTER — RESULT REVIEW (OUTPATIENT)
Age: 64
End: 2024-02-01

## 2024-02-01 ENCOUNTER — OUTPATIENT (OUTPATIENT)
Dept: OUTPATIENT SERVICES | Facility: HOSPITAL | Age: 64
LOS: 1 days | End: 2024-02-01
Payer: MEDICARE

## 2024-02-01 DIAGNOSIS — I25.10 ATHEROSCLEROTIC HEART DISEASE OF NATIVE CORONARY ARTERY WITHOUT ANGINA PECTORIS: ICD-10-CM

## 2024-02-01 DIAGNOSIS — R06.09 OTHER FORMS OF DYSPNEA: ICD-10-CM

## 2024-02-01 PROCEDURE — 78452 HT MUSCLE IMAGE SPECT MULT: CPT

## 2024-02-01 PROCEDURE — 93018 CV STRESS TEST I&R ONLY: CPT

## 2024-02-01 PROCEDURE — 93017 CV STRESS TEST TRACING ONLY: CPT

## 2024-02-01 PROCEDURE — 78452 HT MUSCLE IMAGE SPECT MULT: CPT | Mod: 26

## 2024-02-01 PROCEDURE — A9500: CPT

## 2024-02-01 PROCEDURE — 93016 CV STRESS TEST SUPVJ ONLY: CPT

## 2024-02-23 ENCOUNTER — APPOINTMENT (OUTPATIENT)
Dept: OPHTHALMOLOGY | Facility: CLINIC | Age: 64
End: 2024-02-23
Payer: MEDICARE

## 2024-02-23 ENCOUNTER — NON-APPOINTMENT (OUTPATIENT)
Age: 64
End: 2024-02-23

## 2024-02-23 PROCEDURE — 92004 COMPRE OPH EXAM NEW PT 1/>: CPT

## 2024-03-04 ENCOUNTER — APPOINTMENT (OUTPATIENT)
Dept: HEART AND VASCULAR | Facility: CLINIC | Age: 64
End: 2024-03-04

## 2024-03-06 ENCOUNTER — APPOINTMENT (OUTPATIENT)
Dept: OPHTHALMOLOGY | Facility: CLINIC | Age: 64
End: 2024-03-06

## 2024-03-06 ENCOUNTER — APPOINTMENT (OUTPATIENT)
Dept: PULMONOLOGY | Facility: CLINIC | Age: 64
End: 2024-03-06

## 2024-03-26 RX ORDER — DULAGLUTIDE 0.75 MG/.5ML
0.75 INJECTION, SOLUTION SUBCUTANEOUS
Qty: 1 | Refills: 1 | Status: DISCONTINUED | COMMUNITY
Start: 2024-01-22 | End: 2024-03-26

## 2024-03-28 RX ORDER — VALSARTAN 320 MG/1
320 TABLET, COATED ORAL DAILY
Qty: 90 | Refills: 1 | Status: ACTIVE | COMMUNITY
Start: 2020-04-27 | End: 1900-01-01

## 2024-04-22 ENCOUNTER — APPOINTMENT (OUTPATIENT)
Dept: INTERNAL MEDICINE | Facility: CLINIC | Age: 64
End: 2024-04-22
Payer: MEDICARE

## 2024-04-22 VITALS
DIASTOLIC BLOOD PRESSURE: 87 MMHG | WEIGHT: 174 LBS | TEMPERATURE: 97.1 F | BODY MASS INDEX: 32.02 KG/M2 | OXYGEN SATURATION: 97 % | HEIGHT: 62 IN | HEART RATE: 71 BPM | SYSTOLIC BLOOD PRESSURE: 145 MMHG

## 2024-04-22 VITALS — DIASTOLIC BLOOD PRESSURE: 90 MMHG | SYSTOLIC BLOOD PRESSURE: 130 MMHG

## 2024-04-22 PROCEDURE — G0444 DEPRESSION SCREEN ANNUAL: CPT | Mod: 59

## 2024-04-22 PROCEDURE — G2211 COMPLEX E/M VISIT ADD ON: CPT | Mod: NC,1L

## 2024-04-22 PROCEDURE — 99214 OFFICE O/P EST MOD 30 MIN: CPT | Mod: 25

## 2024-04-22 NOTE — ASSESSMENT
[FreeTextEntry1] : Got prevnar 20 ~ 4 months ago at the pharmacy. Needs shingles vac Cy, upcoming appt in jun [facility]

## 2024-04-22 NOTE — HEALTH RISK ASSESSMENT
[Little interest or pleasure doing things] : 1) Little interest or pleasure doing things [Feeling down, depressed, or hopeless] : 2) Feeling down, depressed, or hopeless [0] : 2) Feeling down, depressed, or hopeless: Not at all (0) [PHQ-2 Negative - No further assessment needed] : PHQ-2 Negative - No further assessment needed [de-identified] : I spend 5 min performing a depression screening on this patient [[id number]] : 0 [Never] : Never

## 2024-04-22 NOTE — HISTORY OF PRESENT ILLNESS
[de-identified] : Mr. CAM is a63 year M with PMH o HTN, CAD s/p CABG, ED, DM and allergic rhinitis who comes to establish care. No complaints today. GLP-1 gives mild nausea but he is tolerating well.

## 2024-04-22 NOTE — HISTORY OF PRESENT ILLNESS
[de-identified] : Mr. CAM is a63 year M with PMH o HTN, CAD s/p CABG, ED, DM and allergic rhinitis who comes to establish care. No complaints today. GLP-1 gives mild nausea but he is tolerating well.

## 2024-04-22 NOTE — HEALTH RISK ASSESSMENT
[Little interest or pleasure doing things] : 1) Little interest or pleasure doing things [Feeling down, depressed, or hopeless] : 2) Feeling down, depressed, or hopeless [0] : 2) Feeling down, depressed, or hopeless: Not at all (0) [PHQ-2 Negative - No further assessment needed] : PHQ-2 Negative - No further assessment needed [de-identified] : I spend 5 min performing a depression screening on this patient [[id number]] : 0 [Never] : Never

## 2024-05-07 ENCOUNTER — APPOINTMENT (OUTPATIENT)
Dept: PULMONOLOGY | Facility: CLINIC | Age: 64
End: 2024-05-07

## 2024-05-22 ENCOUNTER — APPOINTMENT (OUTPATIENT)
Dept: INTERNAL MEDICINE | Facility: CLINIC | Age: 64
End: 2024-05-22
Payer: MEDICARE

## 2024-05-22 VITALS — DIASTOLIC BLOOD PRESSURE: 79 MMHG | SYSTOLIC BLOOD PRESSURE: 139 MMHG

## 2024-05-22 VITALS
TEMPERATURE: 97.9 F | SYSTOLIC BLOOD PRESSURE: 147 MMHG | OXYGEN SATURATION: 99 % | DIASTOLIC BLOOD PRESSURE: 90 MMHG | HEIGHT: 62 IN | WEIGHT: 174 LBS | HEART RATE: 67 BPM | BODY MASS INDEX: 32.02 KG/M2

## 2024-05-22 DIAGNOSIS — E11.9 TYPE 2 DIABETES MELLITUS W/OUT COMPLICATIONS: ICD-10-CM

## 2024-05-22 DIAGNOSIS — G47.33 OBSTRUCTIVE SLEEP APNEA (ADULT) (PEDIATRIC): ICD-10-CM

## 2024-05-22 DIAGNOSIS — I25.10 ATHEROSCLEROTIC HEART DISEASE OF NATIVE CORONARY ARTERY W/OUT ANGINA PECTORIS: ICD-10-CM

## 2024-05-22 PROCEDURE — 90750 HZV VACC RECOMBINANT IM: CPT

## 2024-05-22 PROCEDURE — 99214 OFFICE O/P EST MOD 30 MIN: CPT | Mod: 25

## 2024-05-22 PROCEDURE — G0444 DEPRESSION SCREEN ANNUAL: CPT | Mod: 59

## 2024-05-22 PROCEDURE — 90471 IMMUNIZATION ADMIN: CPT

## 2024-05-22 PROCEDURE — G2211 COMPLEX E/M VISIT ADD ON: CPT | Mod: NC,1L

## 2024-05-22 RX ORDER — BACLOFEN 10 MG/1
10 TABLET ORAL
Qty: 10 | Refills: 0 | Status: ACTIVE | COMMUNITY
Start: 2024-05-22 | End: 1900-01-01

## 2024-05-22 NOTE — HISTORY OF PRESENT ILLNESS
[de-identified] : Mr. CAM is a63 year M with PMH o HTN, CAD s/p CABG, ED, DM and allergic rhinitis who comes for follow-up.  No complaints today

## 2024-05-22 NOTE — HEALTH RISK ASSESSMENT
[Little interest or pleasure doing things] : 1) Little interest or pleasure doing things [Feeling down, depressed, or hopeless] : 2) Feeling down, depressed, or hopeless [de-identified] : I spend 5 min performing a depression screening on this patient [[id number]] : 0 [Former] : Former [> 15 Years] : > 15 Years

## 2024-05-23 ENCOUNTER — NON-APPOINTMENT (OUTPATIENT)
Age: 64
End: 2024-05-23

## 2024-05-23 LAB
25(OH)D3 SERPL-MCNC: 32.6 NG/ML
ALBUMIN SERPL ELPH-MCNC: 4.7 G/DL
ALP BLD-CCNC: 77 U/L
ALT SERPL-CCNC: 20 U/L
ANION GAP SERPL CALC-SCNC: 15 MMOL/L
AST SERPL-CCNC: 16 U/L
BASOPHILS # BLD AUTO: 0.03 K/UL
BASOPHILS NFR BLD AUTO: 0.4 %
BILIRUB SERPL-MCNC: 0.6 MG/DL
BUN SERPL-MCNC: 16 MG/DL
CALCIUM SERPL-MCNC: 10 MG/DL
CHLORIDE SERPL-SCNC: 99 MMOL/L
CHOLEST SERPL-MCNC: 166 MG/DL
CO2 SERPL-SCNC: 25 MMOL/L
CREAT SERPL-MCNC: 0.78 MG/DL
EGFR: 100 ML/MIN/1.73M2
EOSINOPHIL # BLD AUTO: 0.17 K/UL
EOSINOPHIL NFR BLD AUTO: 2.1 %
ESTIMATED AVERAGE GLUCOSE: 111 MG/DL
GLUCOSE SERPL-MCNC: 91 MG/DL
HBA1C MFR BLD HPLC: 5.5 %
HCT VFR BLD CALC: 44.7 %
HDLC SERPL-MCNC: 29 MG/DL
HGB BLD-MCNC: 15.2 G/DL
IMM GRANULOCYTES NFR BLD AUTO: 0.1 %
LDLC SERPL CALC-MCNC: 103 MG/DL
LYMPHOCYTES # BLD AUTO: 1.68 K/UL
LYMPHOCYTES NFR BLD AUTO: 21 %
MAN DIFF?: NORMAL
MCHC RBC-ENTMCNC: 28.1 PG
MCHC RBC-ENTMCNC: 34 GM/DL
MCV RBC AUTO: 82.6 FL
MONOCYTES # BLD AUTO: 0.66 K/UL
MONOCYTES NFR BLD AUTO: 8.2 %
NEUTROPHILS # BLD AUTO: 5.46 K/UL
NEUTROPHILS NFR BLD AUTO: 68.2 %
NONHDLC SERPL-MCNC: 137 MG/DL
PLATELET # BLD AUTO: 288 K/UL
POTASSIUM SERPL-SCNC: 3.2 MMOL/L
PROT SERPL-MCNC: 7.3 G/DL
RBC # BLD: 5.41 M/UL
RBC # FLD: 13.7 %
SODIUM SERPL-SCNC: 139 MMOL/L
TRIGL SERPL-MCNC: 199 MG/DL
WBC # FLD AUTO: 8.01 K/UL

## 2024-05-23 RX ORDER — ATORVASTATIN CALCIUM 40 MG/1
40 TABLET, FILM COATED ORAL
Qty: 90 | Refills: 0 | Status: DISCONTINUED | COMMUNITY
Start: 2023-07-28 | End: 2024-05-23

## 2024-05-23 RX ORDER — ATORVASTATIN CALCIUM 80 MG/1
80 TABLET, FILM COATED ORAL
Qty: 90 | Refills: 1 | Status: ACTIVE | COMMUNITY
Start: 2024-05-23 | End: 1900-01-01

## 2024-06-05 ENCOUNTER — APPOINTMENT (OUTPATIENT)
Dept: GASTROENTEROLOGY | Facility: CLINIC | Age: 64
End: 2024-06-05
Payer: MEDICARE

## 2024-06-05 VITALS
OXYGEN SATURATION: 98 % | HEIGHT: 62 IN | SYSTOLIC BLOOD PRESSURE: 114 MMHG | BODY MASS INDEX: 31.1 KG/M2 | WEIGHT: 169 LBS | TEMPERATURE: 97.4 F | DIASTOLIC BLOOD PRESSURE: 83 MMHG | HEART RATE: 77 BPM

## 2024-06-05 DIAGNOSIS — Z12.11 ENCOUNTER FOR SCREENING FOR MALIGNANT NEOPLASM OF COLON: ICD-10-CM

## 2024-06-05 DIAGNOSIS — Z83.719 ENCOUNTER FOR SCREENING FOR MALIGNANT NEOPLASM OF COLON: ICD-10-CM

## 2024-06-05 DIAGNOSIS — Z86.010 PERSONAL HISTORY OF COLONIC POLYPS: ICD-10-CM

## 2024-06-05 DIAGNOSIS — R19.7 DIARRHEA, UNSPECIFIED: ICD-10-CM

## 2024-06-05 DIAGNOSIS — R11.0 NAUSEA: ICD-10-CM

## 2024-06-05 DIAGNOSIS — K62.5 HEMORRHAGE OF ANUS AND RECTUM: ICD-10-CM

## 2024-06-05 DIAGNOSIS — K21.9 GASTRO-ESOPHAGEAL REFLUX DISEASE W/OUT ESOPHAGITIS: ICD-10-CM

## 2024-06-05 PROCEDURE — 99204 OFFICE O/P NEW MOD 45 MIN: CPT

## 2024-06-05 RX ORDER — POLYETHYLENE GLYCOL 3350 AND ELECTROLYTES WITH LEMON FLAVOR 236; 22.74; 6.74; 5.86; 2.97 G/4L; G/4L; G/4L; G/4L; G/4L
236 POWDER, FOR SOLUTION ORAL
Qty: 1 | Refills: 0 | Status: ACTIVE | COMMUNITY
Start: 2024-06-05 | End: 1900-01-01

## 2024-06-05 RX ORDER — FAMOTIDINE 40 MG/1
40 TABLET, FILM COATED ORAL
Qty: 60 | Refills: 3 | Status: ACTIVE | COMMUNITY
Start: 2024-06-05 | End: 1900-01-01

## 2024-06-05 NOTE — HISTORY OF PRESENT ILLNESS
[FreeTextEntry1] : The patient is a 64-year-old  male with past medical history significant for hypertension, hypercholesterolemia, diabetes mellitus, obstructive sleep apnea on C-PAP, back pain, and coronary artery disease, s/p CABG x4 and permanent pacemaker who was referred to my office by Dr. Ema Shi ([phone]) for dyspepsia, gastroesophageal reflux disease and nausea. The patient also admits to having occasional diarrhea, change in bowel habits, change in caliber of stool and rectal bleeding. The patient has a history of colon polyps.  The patient has a family history of colon polyps.  The patient also came to the office for evaluation for colon cancer screening. I was asked to render an opinion for consultation for the above complaints.   The patient states that he is feeling fine.  The patient denies any abdominal pain.  The patient complains of abdominal gas and bloating.  The patient complains of occasional nausea but denies any vomiting.  The patient complains of gastroesophageal reflux disease but denies any dysphagia. The gastroesophageal reflux disease is worse after meals and late at night and in the early morning.  The patient denies taking medications for the gastroesophageal reflux disease. The patient complains of dyspnea upon exertion but denies any atypical chest pain, shortness of breath or palpitations.  The patient is being flowed by his cardiologist, Dr. Marcelo Castillo [phone].  According to the patient, the cardiac status is stable. The patient denies any diaphoresis. The patient complains of occasional diarrhea but denies any constipation.  The patient has 2 to 3 bowel movements a day. The diarrhea is described as soft in nature.   The patient complains of a change in bowel habits.  The patient complains of a change in caliber of stool.   The patient denies having mucus discharge with the bowel movements.  The patient complains of occasional rectal bleeding but denies any melena or hematemesis.  The rectal bleeding is associated with diarrhea and internal hemorrhoids.  The patient denies any rectal pain or rectal pruritus. The patient complains of weight loss but denies any anorexia.  The patient admits to losing 10 pounds over the past 4 months. The patient attributes the weight loss to recent change in diet and exercise. He denies any fevers or chills.  The patient denies any jaundice or pruritus.  The patient complains of occasional lower back pain.  The patient admits to having a prior upper endoscopy and colonoscopy performed by another gastroenterologist over 10 years ago.  According to the patient, the upper endoscopic findings revealed gastritis.  The colonoscopic findings revealed colon polyps.   The patient admits to a family history of GI problems.  The patient's sister and father had a history of colonic polyps.  The patient's brother had pancreas issue.  (-) smoking, (+) Prior ETOH abuse stopped in 2008, (-) IVDA  Physical Exam: General Appearance: Overweight, no acute distress ENT:  nose clear, ears unremarkable Eyes: No enteric sclera, conjunctiva clear. Neck: Supple, without masses  Respiratory: Breath sounds equal and bilateral, no wheezing no rales or rhonchi Cardiovascular: S1-S2 audible, no murmur, no rubs or gallops GI: (+) BS, soft, nontender, no rebound, no guarding, no masses Liver: liver edge palpated Rectal: not done Musculo-skeletal: Good motor strength, good range of motion, normal appearing extremities Skin: Normal appearing skin, no jaundice, no rashes or nodules Neurological: without focal motor or sensory deficits Patient is moving all extremities spontaneously and to command with normal muscle strength alert and oriented X3 Psychiatric: Good affect, not depressed, not anxious

## 2024-06-05 NOTE — ASSESSMENT
[FreeTextEntry1] : Dyspepsia: The patient complains of dyspeptic symptoms.  The patient was advised to abide by an anti-gas (low FOD-MAP) diet.  The patient was given a pamphlet for anti-gas (low FOD-MAP).  The patient and I reviewed the anti-gas (low FOD-MAP) diet at length. The patient is to start on a trial of Simethicone one tablet 4 times a day p.r.n. abdominal pain and gas. GERD: The patient was advised to avoid late-night meals and dietary indiscretions.  The patient was advised to avoid fried and fatty foods.  The patient was advised to abide by an anti-GERD diet. The patient was given a pamphlet for anti-GERD.  The patient and I reviewed the anti-GERD diet at length. I recommend a trial of famotidine 40 mg twice a day x 3 months for the symptoms. Nausea: The patient complains of nausea. If the symptoms of nausea persists, the patient may require a trial of Zofran 4 mg twice a day.  Diarrhea: The patient complains of diarrhea.  I recommend a low residue diet. The patient is to avoid fiber supplementation. The patient is to consider starting a trial of a probiotic such as Align once a day.  re.  The patient was told of the risks of perforation, emergency surgery, bleeding, infections and missed lesions.  The patient agreed and will follow-up to reassess the symptoms.   Rectal Bleeding: The patient had episodes of rectal bleeding.  The etiology of the rectal bleeding is unclear.  I recommend a trial of Anusol HC suppositories one per rectum QHS and Anusol HC 2.5% cream apply to affected area twice a day PRN hemorrhoidal bleeding or pain.  I recommend a trial of Calmoseptine cream apply to affected area twice a day for rectal discomfort. I recommend Tucks pads for the hemorrhoids.  I recommend starting Sitz baths twice a day for the hemorrhoids.  I recommend avoid wearing tight underwear and use boxers. I recommend avoid touching the perineal area. I recommend a colonoscopy to assess the site of bleeding. The patient was told of the risks and benefits of the procedure.  The patient was told of the risks of perforation, emergency surgery, bleeding, infections and missed lesions.  The patient agreed and will schedule for the procedure. The patient is to be n.p.o. after midnight and bowel prep was given.  The patient is to return for the procedure. History of Colonic Polyps: The patient has a history of colonic polyps.  I recommend a repeat colonoscopy to reassess for colonic polyps.  The patient was told of the risks and benefits of the procedure.  The patient was told of the risks of perforation, emergency surgery, bleeding, infections and missed lesions.  The patient is to be on a clear liquid diet the entire day prior to the procedure. The patient is to complete the entire prescribed bowel prep the day prior to the procedure as directed. The patient is told not to drive, drink alcohol, use recreational drugs, exercise, or work the day of the procedure.  The patient was told of the need for an escort to accompany the patient home after the procedure. The patient is aware that the procedure may be cancelled if they fail to follow the directions.  The patient agreed and will schedule for the procedure. The patient can take the antihypertensive medication with a sip of water one hour prior to the procedure. The patient is to hold the diabetic medication the day before and the morning of the procedure. The patient is to hold the blood thinner medication Plavix for 5 days prior to the procedure. The patient is to be n.p.o. after midnight and bowel prep was given.  The patient is to return for the procedure after cardiac clearance and interrogation of the pacemaker.  Colonoscopy: I recommend a colonoscopy to assess the symptoms and for colonic polyps.  The patient was told of the risks and benefits of the procedure.  The patient was told of the risks of perforation, emergency surgery, bleeding, infections and missed lesions.  The patient is to be on a clear liquid diet the entire day prior to the procedure. The patient is to complete the entire prescribed bowel prep the day prior to the procedure as directed. The patient is told not to drive, drink alcohol, use recreational drugs, exercise, or work the day of the procedure.  The patient was told of the need for an escort to accompany the patient home after the procedure. The patient is aware that the procedure may be cancelled if they fail to follow the directions.  The patient agreed and will schedule for the procedure. The patient can take the antihypertensive medication with a sip of water one hour prior to the procedure. The patient is to hold the diabetic medication the day before and the morning of the procedure. The patient is to hold the blood thinner medication Plavix for 5 days prior to the procedure. The patient is to be n.p.o. after midnight and bowel prep was given.  The patient is to return for the procedure after cardiac clearance and interrogation of the pacemaker.  The patient is to be n.p.o. after midnight and bowel prep was given.  The patient is to return for the procedure. Upper Endoscopy: I recommend an upper endoscopy to assess for peptic ulcer disease versus esophagitis.  The patient was told of the risks and benefits of the procedure.  The patient was told of the risks of perforation, emergency surgery, bleeding, infections and missed lesions. The patient is told not to drive, drink alcohol, use recreational drugs, exercise, or work the day of the procedure.  The patient was told of the need for an escort to accompany the patient home after the procedure. The patient is aware that the procedure may be cancelled if they fail to follow the directions.  The patient agreed and will schedule for the procedure. The patient can take the antihypertensive medication with a sip of water one hour prior to the procedure. The patient is to hold the diabetic medication the day before and the morning of the procedure. The patient is to hold the blood thinner medication Plavix for 5 days prior to the procedure. The patient is to be n.p.o. after midnight and bowel prep was given.  The patient is to return for the procedure after cardiac clearance and interrogation of the pacemaker. The patient is to be n.p.o. after midnight.  The patient is to return for the procedure. Prior Endoscopic Procedures: The patient had a prior upper endoscopy and colonoscopy performed by another gastroenterologist.  I will try to obtain the prior upper endoscopy and colonoscopy reports.  The patient is to sign a record release to obtain those records. Follow-up: The patient is to follow-up in the office in 4 weeks to reassess the symptoms. The patient was told to call the office if any further problems.

## 2024-06-11 ENCOUNTER — APPOINTMENT (OUTPATIENT)
Dept: HEART AND VASCULAR | Facility: CLINIC | Age: 64
End: 2024-06-11
Payer: MEDICARE

## 2024-06-11 VITALS
WEIGHT: 167 LBS | HEART RATE: 73 BPM | TEMPERATURE: 96.6 F | BODY MASS INDEX: 30.73 KG/M2 | SYSTOLIC BLOOD PRESSURE: 111 MMHG | OXYGEN SATURATION: 98 % | DIASTOLIC BLOOD PRESSURE: 76 MMHG | HEIGHT: 62 IN

## 2024-06-11 DIAGNOSIS — I10 ESSENTIAL (PRIMARY) HYPERTENSION: ICD-10-CM

## 2024-06-11 DIAGNOSIS — Z98.61 ATHEROSCLEROTIC HEART DISEASE OF NATIVE CORONARY ARTERY W/OUT ANGINA PECTORIS: ICD-10-CM

## 2024-06-11 DIAGNOSIS — I25.10 ATHEROSCLEROTIC HEART DISEASE OF NATIVE CORONARY ARTERY W/OUT ANGINA PECTORIS: ICD-10-CM

## 2024-06-11 DIAGNOSIS — R94.31 ABNORMAL ELECTROCARDIOGRAM [ECG] [EKG]: ICD-10-CM

## 2024-06-11 DIAGNOSIS — E78.5 HYPERLIPIDEMIA, UNSPECIFIED: ICD-10-CM

## 2024-06-11 PROCEDURE — 99214 OFFICE O/P EST MOD 30 MIN: CPT | Mod: 25

## 2024-06-11 PROCEDURE — 93000 ELECTROCARDIOGRAM COMPLETE: CPT

## 2024-06-11 RX ORDER — DEXTROMETHORPHAN HBR 15 MG/1
15 CAPSULE, LIQUID FILLED ORAL
Qty: 30 | Refills: 0 | Status: DISCONTINUED | COMMUNITY
Start: 2024-01-22 | End: 2024-06-11

## 2024-06-11 RX ORDER — RANOLAZINE 500 MG/1
500 TABLET, EXTENDED RELEASE ORAL
Qty: 60 | Refills: 1 | Status: DISCONTINUED | COMMUNITY
Start: 2023-06-29 | End: 2024-06-11

## 2024-06-11 RX ORDER — FLUTICASONE PROPIONATE 110 UG/1
110 AEROSOL, METERED RESPIRATORY (INHALATION) DAILY
Qty: 1 | Refills: 0 | Status: DISCONTINUED | COMMUNITY
Start: 2021-04-01 | End: 2024-06-11

## 2024-06-11 RX ORDER — FLUTICASONE PROPIONATE 50 UG/1
50 SPRAY, METERED NASAL DAILY
Qty: 1 | Refills: 1 | Status: DISCONTINUED | COMMUNITY
Start: 2024-01-22 | End: 2024-06-11

## 2024-06-11 NOTE — HISTORY OF PRESENT ILLNESS
[FreeTextEntry1] : lost weight- no cp/sob- statin incerased last month because of LDL not being at goal

## 2024-06-11 NOTE — DISCUSSION/SUMMARY
[FreeTextEntry1] : EKG:NSR,LVH continue asa +Plavix + Toprol for CAD;lipitor and f/u labs for HLD;valsaartn + amlodipine for HPTN

## 2024-06-26 RX ORDER — BENZONATATE 200 MG/1
200 CAPSULE ORAL
Qty: 40 | Refills: 0 | Status: ACTIVE | COMMUNITY
Start: 2024-06-26 | End: 1900-01-01

## 2024-07-17 ENCOUNTER — APPOINTMENT (OUTPATIENT)
Dept: PULMONOLOGY | Facility: CLINIC | Age: 64
End: 2024-07-17

## 2024-07-17 ENCOUNTER — APPOINTMENT (OUTPATIENT)
Dept: PULMONOLOGY | Facility: CLINIC | Age: 64
End: 2024-07-17
Payer: MEDICARE

## 2024-07-17 VITALS
DIASTOLIC BLOOD PRESSURE: 77 MMHG | SYSTOLIC BLOOD PRESSURE: 120 MMHG | HEIGHT: 62 IN | TEMPERATURE: 97.2 F | OXYGEN SATURATION: 99 % | WEIGHT: 168 LBS | BODY MASS INDEX: 30.91 KG/M2 | HEART RATE: 67 BPM

## 2024-07-17 DIAGNOSIS — R09.82 POSTNASAL DRIP: ICD-10-CM

## 2024-07-17 PROCEDURE — 99213 OFFICE O/P EST LOW 20 MIN: CPT

## 2024-07-17 RX ORDER — AZELASTINE HYDROCHLORIDE AND FLUTICASONE PROPIONATE 137; 50 UG/1; UG/1
137-50 SPRAY, METERED NASAL
Qty: 1 | Refills: 3 | Status: DISCONTINUED | COMMUNITY
Start: 2024-07-17 | End: 2024-07-17

## 2024-07-17 RX ORDER — FLUTICASONE PROPIONATE 50 UG/1
50 SPRAY, METERED NASAL DAILY
Qty: 1 | Refills: 11 | Status: ACTIVE | COMMUNITY
Start: 2024-07-17 | End: 1900-01-01

## 2024-07-17 RX ORDER — ALBUTEROL SULFATE 90 UG/1
108 (90 BASE) INHALANT RESPIRATORY (INHALATION)
Qty: 1 | Refills: 11 | Status: ACTIVE | COMMUNITY
Start: 2024-07-17 | End: 1900-01-01

## 2024-07-17 RX ORDER — AZELASTINE HYDROCHLORIDE 137 UG/1
137 SPRAY, METERED NASAL
Qty: 1 | Refills: 5 | Status: ACTIVE | COMMUNITY
Start: 2024-07-17 | End: 1900-01-01

## 2024-07-22 ENCOUNTER — APPOINTMENT (OUTPATIENT)
Dept: INTERNAL MEDICINE | Facility: CLINIC | Age: 64
End: 2024-07-22

## 2024-07-22 ENCOUNTER — APPOINTMENT (OUTPATIENT)
Dept: INTERNAL MEDICINE | Facility: CLINIC | Age: 64
End: 2024-07-22
Payer: MEDICARE

## 2024-07-22 VITALS — DIASTOLIC BLOOD PRESSURE: 88 MMHG | HEART RATE: 93 BPM | OXYGEN SATURATION: 99 % | SYSTOLIC BLOOD PRESSURE: 137 MMHG

## 2024-07-22 PROCEDURE — 90750 HZV VACC RECOMBINANT IM: CPT

## 2024-07-22 PROCEDURE — 90471 IMMUNIZATION ADMIN: CPT

## 2024-08-01 ENCOUNTER — APPOINTMENT (OUTPATIENT)
Dept: INTERNAL MEDICINE | Facility: CLINIC | Age: 64
End: 2024-08-01
Payer: MEDICARE

## 2024-08-01 VITALS
HEART RATE: 61 BPM | BODY MASS INDEX: 31.36 KG/M2 | SYSTOLIC BLOOD PRESSURE: 117 MMHG | DIASTOLIC BLOOD PRESSURE: 69 MMHG | HEIGHT: 62 IN | WEIGHT: 170.44 LBS | OXYGEN SATURATION: 98 % | TEMPERATURE: 98.2 F

## 2024-08-01 DIAGNOSIS — I25.10 ATHEROSCLEROTIC HEART DISEASE OF NATIVE CORONARY ARTERY W/OUT ANGINA PECTORIS: ICD-10-CM

## 2024-08-01 DIAGNOSIS — I10 ESSENTIAL (PRIMARY) HYPERTENSION: ICD-10-CM

## 2024-08-01 DIAGNOSIS — E11.9 TYPE 2 DIABETES MELLITUS W/OUT COMPLICATIONS: ICD-10-CM

## 2024-08-01 DIAGNOSIS — G47.33 OBSTRUCTIVE SLEEP APNEA (ADULT) (PEDIATRIC): ICD-10-CM

## 2024-08-01 PROCEDURE — G2211 COMPLEX E/M VISIT ADD ON: CPT | Mod: NC

## 2024-08-01 PROCEDURE — 99215 OFFICE O/P EST HI 40 MIN: CPT

## 2024-08-01 RX ORDER — FEXOFENADINE HYDROCHLORIDE 180 MG/1
180 TABLET ORAL
Qty: 30 | Refills: 2 | Status: ACTIVE | COMMUNITY
Start: 2024-08-01 | End: 1900-01-01

## 2024-08-01 NOTE — HISTORY OF PRESENT ILLNESS
[de-identified] : Mr. CAM is a63 year M with PMH o HTN, CAD s/p CABG, ED, DM and allergic rhinitis who comes for follow-up. No complaints today

## 2024-08-02 LAB
ANION GAP SERPL CALC-SCNC: 17 MMOL/L
BUN SERPL-MCNC: 17 MG/DL
CALCIUM SERPL-MCNC: 9.8 MG/DL
CHLORIDE SERPL-SCNC: 98 MMOL/L
CO2 SERPL-SCNC: 26 MMOL/L
CREAT SERPL-MCNC: 0.92 MG/DL
EGFR: 93 ML/MIN/1.73M2
GLUCOSE SERPL-MCNC: 160 MG/DL
MAGNESIUM SERPL-MCNC: 1.9 MG/DL
POTASSIUM SERPL-SCNC: 3.1 MMOL/L
SODIUM SERPL-SCNC: 140 MMOL/L

## 2024-08-02 RX ORDER — FUROSEMIDE 20 MG/1
20 TABLET ORAL
Qty: 90 | Refills: 1 | Status: ACTIVE | COMMUNITY
Start: 2024-08-02 | End: 1900-01-01

## 2024-08-02 RX ORDER — SPIRONOLACTONE 25 MG/1
25 TABLET ORAL
Qty: 90 | Refills: 3 | Status: ACTIVE | COMMUNITY
Start: 2024-08-02 | End: 1900-01-01

## 2024-08-14 ENCOUNTER — APPOINTMENT (OUTPATIENT)
Dept: CARE COORDINATION | Facility: HOME HEALTH | Age: 64
End: 2024-08-14

## 2024-08-14 VITALS — BODY MASS INDEX: 31.65 KG/M2 | WEIGHT: 172 LBS | HEIGHT: 62 IN

## 2024-08-14 DIAGNOSIS — Z98.61 ATHEROSCLEROTIC HEART DISEASE OF NATIVE CORONARY ARTERY W/OUT ANGINA PECTORIS: ICD-10-CM

## 2024-08-14 DIAGNOSIS — Z13.31 ENCOUNTER FOR SCREENING FOR DEPRESSION: ICD-10-CM

## 2024-08-14 DIAGNOSIS — E11.69 TYPE 2 DIABETES MELLITUS WITH OTHER SPECIFIED COMPLICATION: ICD-10-CM

## 2024-08-14 DIAGNOSIS — E11.59 TYPE 2 DIABETES MELLITUS WITH OTHER SPECIFIED COMPLICATION: ICD-10-CM

## 2024-08-14 DIAGNOSIS — I25.10 ATHEROSCLEROTIC HEART DISEASE OF NATIVE CORONARY ARTERY W/OUT ANGINA PECTORIS: ICD-10-CM

## 2024-08-14 DIAGNOSIS — I15.2 TYPE 2 DIABETES MELLITUS WITH OTHER SPECIFIED COMPLICATION: ICD-10-CM

## 2024-08-14 DIAGNOSIS — E66.9 TYPE 2 DIABETES MELLITUS WITH OTHER SPECIFIED COMPLICATION: ICD-10-CM

## 2024-08-14 DIAGNOSIS — I10 ESSENTIAL (PRIMARY) HYPERTENSION: ICD-10-CM

## 2024-08-14 PROCEDURE — 99350 HOME/RES VST EST HIGH MDM 60: CPT | Mod: 25,95

## 2024-08-14 PROCEDURE — G0447 BEHAVIOR COUNSEL OBESITY 15M: CPT | Mod: 93,59

## 2024-08-14 PROCEDURE — G0444 DEPRESSION SCREEN ANNUAL: CPT | Mod: 93,59

## 2024-08-14 NOTE — COUNSELING
[Fall prevention counseling provided] : Fall prevention counseling provided [Adequate lighting] : Adequate lighting [No throw rugs] : No throw rugs [Use proper foot wear] : Use proper foot wear [Use recommended devices] : Use recommended devices [Behavioral health counseling provided] : Behavioral health counseling provided [Sleep ___ hours/day] : Sleep [unfilled] hours/day [Engage in a relaxing activity] : Engage in a relaxing activity [Plan in advance] : Plan in advance [No] : Risk of tobacco use and health benefits of smoking cessation discussed: No [Potential consequences of obesity discussed] : Potential consequences of obesity discussed [Benefits of weight loss discussed] : Benefits of weight loss discussed [Encouraged to maintain food diary] : Encouraged to maintain food diary [Encouraged to increase physical activity] : Encouraged to increase physical activity [Encouraged to use exercise tracking device] : Encouraged to use exercise tracking device [Weigh Self Weekly] : weigh self weekly [Decrease Portions] : decrease portions [Keep Food Diary] : keep food diary [Good understanding] : Patient has a good understanding of disease, goals and obesity follow-up plan

## 2024-08-14 NOTE — PHYSICAL EXAM
[Normal Sclera/Conjunctiva] : normal sclera/conjunctiva [Normal Outer Ear/Nose] : the outer ears and nose were normal in appearance [No Respiratory Distress] : no respiratory distress  [Soft] : abdomen soft [Non Tender] : non-tender [Non-distended] : non-distended [No Masses] : no abdominal mass palpated [Speech Grossly Normal] : speech grossly normal [Memory Grossly Normal] : memory grossly normal [Alert and Oriented x3] : oriented to person, place, and time [Normal Mood] : the mood was normal [Normal] : affect was normal and insight and judgment were intact

## 2024-08-14 NOTE — HEALTH RISK ASSESSMENT
[No] : No [1 or 2 (0 pts)] : 1 or 2 (0 points) [Never (0 pts)] : Never (0 points) [No falls in past year] : Patient reported no falls in the past year [Assistive Device] : Patient uses an assistive device [Little interest or pleasure doing things] : 1) Little interest or pleasure doing things [Feeling down, depressed, or hopeless] : 2) Feeling down, depressed, or hopeless [0] : 2) Feeling down, depressed, or hopeless: Not at all (0) [PHQ-2 Negative - No further assessment needed] : PHQ-2 Negative - No further assessment needed [I have developed a follow-up plan documented below in the note.] : I have developed a follow-up plan documented below in the note. [Time Spent: ___ Minutes] : I spent [unfilled] minutes performing a depression screening for this patient. [With Patient/Caregiver] : , with patient/caregiver [Designated Healthcare Proxy] : Designated healthcare proxy [Name: ___] : Health Care Proxy's Name: [unfilled]  [Relationship: ___] : Relationship: [unfilled] [I will adhere to the patient's wishes.] : I will adhere to the patient's wishes. [Time Spent: ___ minutes] : Time Spent: [unfilled] minutes [Never] : Never

## 2024-08-14 NOTE — REVIEW OF SYSTEMS
[Vision Problems] : vision problems [Joint Pain] : joint pain [Back Pain] : back pain [Negative] : Psychiatric

## 2024-09-03 ENCOUNTER — APPOINTMENT (OUTPATIENT)
Dept: HEART AND VASCULAR | Facility: CLINIC | Age: 64
End: 2024-09-03
Payer: MEDICARE

## 2024-09-03 VITALS
WEIGHT: 174 LBS | BODY MASS INDEX: 32.02 KG/M2 | SYSTOLIC BLOOD PRESSURE: 121 MMHG | HEIGHT: 62 IN | TEMPERATURE: 98 F | DIASTOLIC BLOOD PRESSURE: 72 MMHG | OXYGEN SATURATION: 98 % | HEART RATE: 60 BPM

## 2024-09-03 DIAGNOSIS — I25.10 ATHEROSCLEROTIC HEART DISEASE OF NATIVE CORONARY ARTERY W/OUT ANGINA PECTORIS: ICD-10-CM

## 2024-09-03 DIAGNOSIS — E78.5 HYPERLIPIDEMIA, UNSPECIFIED: ICD-10-CM

## 2024-09-03 DIAGNOSIS — Z98.61 ATHEROSCLEROTIC HEART DISEASE OF NATIVE CORONARY ARTERY W/OUT ANGINA PECTORIS: ICD-10-CM

## 2024-09-03 DIAGNOSIS — R94.31 ABNORMAL ELECTROCARDIOGRAM [ECG] [EKG]: ICD-10-CM

## 2024-09-03 DIAGNOSIS — I10 ESSENTIAL (PRIMARY) HYPERTENSION: ICD-10-CM

## 2024-09-03 PROCEDURE — 99214 OFFICE O/P EST MOD 30 MIN: CPT | Mod: 25

## 2024-09-03 PROCEDURE — 36415 COLL VENOUS BLD VENIPUNCTURE: CPT

## 2024-09-03 PROCEDURE — 93000 ELECTROCARDIOGRAM COMPLETE: CPT

## 2024-09-03 PROCEDURE — G2211 COMPLEX E/M VISIT ADD ON: CPT | Mod: NC

## 2024-09-03 NOTE — DISCUSSION/SUMMARY
[FreeTextEntry1] : EKG:NASR,NSST wave abbn PCI in 2015- negative stress- feel can stop asa and continue plavix + toprol for CAD; continue lipitor for HLD and chk labs norvasc + valsartan for HPTN

## 2024-09-04 LAB
ALBUMIN SERPL ELPH-MCNC: 4.4 G/DL
ALP BLD-CCNC: 71 U/L
ALT SERPL-CCNC: 25 U/L
ANION GAP SERPL CALC-SCNC: 12 MMOL/L
AST SERPL-CCNC: 21 U/L
BILIRUB SERPL-MCNC: 0.3 MG/DL
BUN SERPL-MCNC: 13 MG/DL
CALCIUM SERPL-MCNC: 9.2 MG/DL
CHLORIDE SERPL-SCNC: 105 MMOL/L
CHOLEST SERPL-MCNC: 180 MG/DL
CO2 SERPL-SCNC: 24 MMOL/L
CREAT SERPL-MCNC: 0.78 MG/DL
EGFR: 100 ML/MIN/1.73M2
GLUCOSE SERPL-MCNC: 106 MG/DL
HDLC SERPL-MCNC: 35 MG/DL
LDLC SERPL CALC-MCNC: 113 MG/DL
NONHDLC SERPL-MCNC: 145 MG/DL
POTASSIUM SERPL-SCNC: 4.6 MMOL/L
PROT SERPL-MCNC: 7.1 G/DL
SODIUM SERPL-SCNC: 141 MMOL/L
TRIGL SERPL-MCNC: 182 MG/DL

## 2024-09-04 RX ORDER — ALIROCUMAB 75 MG/ML
75 INJECTION, SOLUTION SUBCUTANEOUS
Qty: 2 | Refills: 3 | Status: ACTIVE | COMMUNITY
Start: 2024-09-04 | End: 1900-01-01

## 2024-09-19 ENCOUNTER — APPOINTMENT (OUTPATIENT)
Dept: GASTROENTEROLOGY | Facility: HOSPITAL | Age: 64
End: 2024-09-19

## 2024-10-07 NOTE — ASU PATIENT PROFILE, ADULT - NSICDXPASTMEDICALHX_GEN_ALL_CORE_FT
PAST MEDICAL HISTORY:  HTN (hypertension)     Hyperlipidemia with target low density lipoprotein (LDL) cholesterol less than 50 mg/dL

## 2024-10-08 ENCOUNTER — RESULT REVIEW (OUTPATIENT)
Age: 64
End: 2024-10-08

## 2024-10-08 ENCOUNTER — APPOINTMENT (OUTPATIENT)
Dept: GASTROENTEROLOGY | Facility: HOSPITAL | Age: 64
End: 2024-10-08

## 2024-10-08 ENCOUNTER — TRANSCRIPTION ENCOUNTER (OUTPATIENT)
Age: 64
End: 2024-10-08

## 2024-10-08 ENCOUNTER — OUTPATIENT (OUTPATIENT)
Dept: OUTPATIENT SERVICES | Facility: HOSPITAL | Age: 64
LOS: 1 days | End: 2024-10-08
Payer: COMMERCIAL

## 2024-10-08 VITALS
RESPIRATION RATE: 14 BRPM | WEIGHT: 171.96 LBS | HEART RATE: 56 BPM | TEMPERATURE: 98 F | SYSTOLIC BLOOD PRESSURE: 123 MMHG | DIASTOLIC BLOOD PRESSURE: 100 MMHG | HEIGHT: 62 IN | OXYGEN SATURATION: 98 %

## 2024-10-08 VITALS
HEART RATE: 60 BPM | SYSTOLIC BLOOD PRESSURE: 127 MMHG | OXYGEN SATURATION: 99 % | DIASTOLIC BLOOD PRESSURE: 75 MMHG | RESPIRATION RATE: 17 BRPM

## 2024-10-08 DIAGNOSIS — K21.9 GASTRO-ESOPHAGEAL REFLUX DISEASE WITHOUT ESOPHAGITIS: ICD-10-CM

## 2024-10-08 DIAGNOSIS — Z95.0 PRESENCE OF CARDIAC PACEMAKER: Chronic | ICD-10-CM

## 2024-10-08 DIAGNOSIS — Z95.5 PRESENCE OF CORONARY ANGIOPLASTY IMPLANT AND GRAFT: Chronic | ICD-10-CM

## 2024-10-08 LAB — GLUCOSE BLDC GLUCOMTR-MCNC: 100 MG/DL — HIGH (ref 70–99)

## 2024-10-08 PROCEDURE — 43239 EGD BIOPSY SINGLE/MULTIPLE: CPT

## 2024-10-08 PROCEDURE — 88312 SPECIAL STAINS GROUP 1: CPT

## 2024-10-08 PROCEDURE — 88312 SPECIAL STAINS GROUP 1: CPT | Mod: 26

## 2024-10-08 PROCEDURE — 45385 COLONOSCOPY W/LESION REMOVAL: CPT

## 2024-10-08 PROCEDURE — 88305 TISSUE EXAM BY PATHOLOGIST: CPT

## 2024-10-08 PROCEDURE — 88305 TISSUE EXAM BY PATHOLOGIST: CPT | Mod: 26

## 2024-10-08 PROCEDURE — 82962 GLUCOSE BLOOD TEST: CPT

## 2024-10-08 PROCEDURE — 45380 COLONOSCOPY AND BIOPSY: CPT

## 2024-10-08 RX ORDER — SODIUM CHLORIDE 0.9 % (FLUSH) 0.9 %
500 SYRINGE (ML) INJECTION
Refills: 0 | Status: COMPLETED | OUTPATIENT
Start: 2024-10-08 | End: 2024-10-08

## 2024-10-08 RX ADMIN — Medication 30 MILLILITER(S): at 14:06

## 2024-10-08 NOTE — CHART NOTE - NSCHARTNOTEFT_GEN_A_CORE
History of Present Illness       The patient is a 64-year-old  male with past medical history significant for hypertension, hypercholesterolemia, diabetes mellitus, obstructive sleep apnea on C-PAP, back pain, and coronary artery disease, s/p CABG x4 and permanent pacemaker who was referred to my office by Dr. Jose Guadalupe Lopez ([phone]) for dyspepsia, gastroesophageal reflux disease and nausea. The patient also admits to having occasional diarrhea, change in bowel habits, change in caliber of stool and rectal bleeding. The patient has a history of colon polyps. The patient admits to losing 10 pounds over 4 months. The patient admits to a family history of GI problems. The patient's sister and father had a history of colonic polyps. The patient's brother had pancreas issue.  I was asked to render an opinion for consultation for the above complaints. The patient states that he is feeling fine. The patient denies any abdominal pain. The patient complains of abdominal gas and bloating. The patient complains of occasional nausea but denies any vomiting. The patient complains of gastroesophageal reflux disease but denies any dysphagia. The gastroesophageal reflux disease is worse after meals and late at night and in the early morning. The patient denies taking medications for the gastroesophageal reflux disease. The patient complains of dyspnea upon exertion but denies any atypical chest pain, shortness of breath or palpitations. The patient is being flowed by his cardiologist, Dr. Marcelo Castillo [phone]. According to the patient, the cardiac status is stable. The patient denies any diaphoresis. The patient complains of occasional diarrhea but denies any constipation. The patient has 2 to 3 bowel movements a day. The diarrhea is described as soft in nature. The patient complains of a change in bowel habits. The patient complains of a change in caliber of stool. The patient denies having mucus discharge with the bowel movements. The patient complains of occasional rectal bleeding but denies any melena or hematemesis. The rectal bleeding is associated with diarrhea and internal hemorrhoids. The patient denies any rectal pain or rectal pruritus. The patient complains of weight loss but denies any anorexia. The patient admits to losing 10 pounds over the past 4 months. The patient attributes the weight loss to recent change in diet and exercise. He denies any fevers or chills. The patient denies any jaundice or pruritus. The patient complains of occasional lower back pain. The patient admits to having a prior upper endoscopy and colonoscopy performed by another gastroenterologist over 10 years ago. According to the patient, the upper endoscopic findings revealed gastritis. The colonoscopic findings revealed colon polyps. The patient admits to a family history of GI problems. The patient's sister and father had a history of colonic polyps. The patient's brother had pancreas issue.  ?  (-) smoking, (+) Prior ETOH abuse stopped in 2008, (-) IVDA  ?  Physical Exam:  General Appearance: Overweight, no acute distress  ENT: nose clear, ears unremarkable  Eyes: No enteric sclera, conjunctiva clear.  Neck: Supple, without masses  Respiratory: Breath sounds equal and bilateral, no wheezing no rales or rhonchi  Cardiovascular: S1-S2 audible, no murmur, no rubs or gallops  GI: (+) BS, soft, nontender, no rebound, no guarding, no masses  Liver: liver edge palpated  Rectal: not done  Musculo-skeletal: Good motor strength, good range of motion, normal appearing extremities  Skin: Normal appearing skin, no jaundice, no rashes or nodules  Neurological: without focal motor or sensory deficits Patient is moving all extremities spontaneously and to command with normal muscle strength alert and oriented X3  Psychiatric: Good affect, not depressed, not anxious  ?  Active Problems  Abnormal ECG (794.31) (R94.31)  Accelerated essential hypertension (401.0) (I10)  Acute allergic rhinitis (477.9) (J30.9)  Aortic valve insufficiency, acquired (424.1) (I35.1)  Benign hypertension (401.1) (I10)  Bilateral impacted cerumen (380.4) (H61.23)  Borderline hyperglycemia (790.29) (R73.9)  CAD S/P percutaneous coronary angioplasty (414.01,V45.82) (I25.10,Z98.61)  CAD, multiple vessel (414.00) (I25.10)  Cardiomyopathy (425.4) (I42.9)  Carpal tunnel syndrome (354.0) (G56.00)  Chest pain (786.50) (R07.9)  Chronic cough (786.2) (R05.3)  Cough (786.2) (R05.9)  Diabetes mellitus (250.00) (E11.9)  Dyspnea on exertion (786.09) (R06.09)  Elevated hemoglobin A1c (790.29) (R73.09)  Erectile dysfunction (607.84) (N52.9)  Hyperlipidemia (272.4) (E78.5)  Hypokalemia (276.8) (E87.6)  Immunization, pneumococcus and influenza (V06.6) (Z23)  Influenza vaccination administered at current visit (V04.81) (Z23)  Ischemic cardiomyopathy (414.8) (I25.5)  LVH (left ventricular hypertrophy) (429.3) (I51.7)  Mild aortic insufficiency (424.1) (I35.1)  Moderate aortic insufficiency (424.1) (I35.1)  Multiple pulmonary nodules (793.19) (R91.8)  OAD (obstructive airway disease) (496) (J44.9)  JANES (obstructive sleep apnea) (327.23) (G47.33)  Presence of permanent cardiac pacemaker (V45.01) (Z95.0)  Pulmonary insufficiency (518.82) (J98.4)  Restrictive lung disease (518.89) (J98.4)  S/P CABG x 4 (V45.81) (Z95.1)  Screening for colon cancer (V76.51) (Z12.11)  Screening for viral disease (V73.99) (Z11.59)  Sensation of plugged ear on both sides (388.8) (H93.8X3)  Shortness of breath on exertion (786.05) (R06.02)  ST segment changes on electrocardiogram (794.31) (R94.31)           ?  Past Medical History  History of coronary artery disease (V12.59) (Z86.79)  History of high cholesterol (V12.29) (Z86.39)  History of hypertension (V12.59) (Z86.79)           ?  Surgical History  History of CABG Three Or More Nonautogenous Grafts  History of Permanent pacemaker insertion           ?  Family History  Family history of Diabetes mellitus of other type without complication : Sister  Family history of cerebrovascular accident (CVA) (V17.1) (Z82.3) : Father  Family history of cerebrovascular accident (CVA) (V17.1) (Z82.3) : Father           ?  Social History  Never a smoker  No alcohol use  No illicit drug use           ?  Current Meds  Albuterol Sulfate  (90 Base) MCG/ACT Inhalation Aerosol Solution; INHALE 2  PUFFS EVERY 4-6 HOURS AS NEEDED  amLODIPine Besylate 10 MG Oral Tablet; TAKE ONE TABLET BY MOUTH DAILY  Atorvastatin Calcium 80 MG Oral Tablet; TAKE 1 TABLET AT BEDTIME  Baclofen 10 MG Oral Tablet; TAKE 1  TABLET Oral Daily at night for 5 days  Lexie Aspirin EC Low Dose 81 MG Oral Tablet Delayed Release; TAKE 1 TABLET DAILY  Chlorthalidone 50 MG Oral Tablet; TAKE ONE TABLET BY MOUTH DAILY  Clopidogrel Bisulfate 75 MG Oral Tablet; TAKE ONE TABLET BY MOUTH DAILY  Dextromethorphan HBr 15 MG Oral Capsule; TAKE 1 CAPSULE Every twelve hours PRN  Fexofenadine HCl - 180 MG Oral Tablet; Take 1 tablet daily  Fluticasone Propionate 50 MCG/ACT Nasal Suspension; INSTILL 1 SQUIRT Daily  Fluticasone Propionate  MCG/ACT Inhalation Aerosol; INHALE 1 PUFF DAILY  Furosemide 40 MG Oral Tablet; TAKE 1 TABLET DAILY  LORazepam 1 MG Oral Tablet; TAKE 1 TABLET DAILY AS NEEDED. MDD:3 mg  Metoprolol Succinate  MG Oral Tablet Extended Release 24 Hour; TAKE 1 TABLET  DAILY  Montelukast Sodium 10 MG Oral Tablet; TAKE 1 TABLET AT BEDTIME  Potassium Chloride Sandra ER 20 MEQ Oral Tablet Extended Release; TAKE 1 TABLET BY  MOUTH DAILY  Potassium Chloride ER 10 MEQ Oral Tablet Extended Release; TAKE 1 TABLET 3 TIMES  DAILY  QUEtiapine Fumarate 25 MG Oral Tablet; TAKE 1 TABLET AT BEDTIME  Ranolazine  MG Oral Tablet Extended Release 12 Hour; TAKE 1 TABLET EVERY  12 HOURS  Sildenafil Citrate 50 MG Oral Tablet; TAKE 1 TABLET DAILY 1 HOUR BEFORE NEEDED  Trulicity 1.5 MG/0.5ML Subcutaneous Solution Pen-injector; INJECT 1 UNIT Weekly  Valsartan 320 MG Oral Tablet; TAKE 1 TABLET DAILY           Allergies  clonidine  Amoxicillin TABS  Clarithromycin TABS  Omeprazole CPDR  Grass pollen           ?  Assessment  GERD without esophagitis (530.81) (K21.9)  Diarrhea, unspecified type (787.91) (R19.7)  Nausea (787.02) (R11.0)  Rectal bleeding (569.3) (K62.5)  History of colonic polyps (V12.72) (Z86.010)  Encounter for colonoscopy in patient with family history of colon polyps (V76.51,V18.51)  (Z12.11,Z83.719)    Dyspepsia: The patient complains of dyspeptic symptoms. The patient was advised to abide by an anti-gas (low FOD-MAP) diet. The patient was given a pamphlet for anti-gas (low FOD-MAP). The patient and I reviewed the anti-gas (low FOD-MAP) diet at length. The patient is to start on a trial of Simethicone one tablet 4 times a day p.r.n. abdominal pain and gas.  GERD: The patient was advised to avoid late-night meals and dietary indiscretions. The patient was advised to avoid fried and fatty foods. The patient was advised to abide by an anti-GERD diet. The patient was given a pamphlet for anti-GERD. The patient and I reviewed the anti-GERD diet at length. I recommend a trial of famotidine 40 mg twice a day x 3 months for the symptoms.  Nausea: The patient complains of nausea. If the symptoms of nausea persists, the patient may require a trial of Zofran 4 mg twice a day.  Diarrhea: The patient complains of diarrhea. I recommend a low residue diet. The patient is to avoid fiber supplementation. The patient is to consider starting a trial of a probiotic such as Align once a day. re. The patient was told of the risks of perforation, emergency surgery, bleeding, infections and missed lesions. The patient agreed and will follow-up to reassess the symptoms.  Rectal Bleeding: The patient had episodes of rectal bleeding. The etiology of the rectal bleeding is unclear. I recommend a trial of Anusol HC suppositories one per rectum QHS and Anusol HC 2.5% cream apply to affected area twice a day PRN hemorrhoidal bleeding or pain. I recommend a trial of Calmoseptine cream apply to affected area twice a day for rectal discomfort. I recommend Tucks pads for the hemorrhoids. I recommend starting Sitz baths twice a day for the hemorrhoids. I recommend avoid wearing tight underwear and use boxers. I recommend avoid touching the perineal area. I recommend a colonoscopy to assess the site of bleeding. The patient was told of the risks and benefits of the procedure. The patient was told of the risks of perforation, emergency surgery, bleeding, infections and missed lesions. The patient agreed and will schedule for the procedure. The patient is to be n.p.o. after midnight and bowel prep was given. The patient is to return for the procedure.  History of Colonic Polyps: The patient has a history of colonic polyps. I recommend a repeat colonoscopy to reassess for colonic polyps. The patient was told of the risks and benefits of the procedure. The patient was told of the risks of perforation, emergency surgery, bleeding, infections and missed lesions. The patient is to be on a clear liquid diet the entire day prior to the procedure. The patient is to complete the entire prescribed bowel prep the day prior to the procedure as directed. The patient is told not to drive, drink alcohol, use recreational drugs, exercise, or work the day of the procedure. The patient was told of the need for an escort to accompany the patient home after the procedure. The patient is aware that the procedure may be cancelled if they fail to follow the directions. The patient agreed and will schedule for the procedure. The patient can take the antihypertensive medication with a sip of water one hour prior to the procedure. The patient is to hold the diabetic medication the day before and the morning of the procedure. The patient is to hold the blood thinner medication Plavix for 5 days prior to the procedure. The patient is to be n.p.o. after midnight and bowel prep was given. The patient is to return for the procedure after cardiac clearance and interrogation of the pacemaker.  Colonoscopy: I recommend a colonoscopy to assess the symptoms and for colonic polyps. The patient was told of the risks and benefits of the procedure. The patient was told of the risks of perforation, emergency surgery, bleeding, infections and missed lesions. The patient is to be on a clear liquid diet the entire day prior to the procedure. The patient is to complete the entire prescribed bowel prep the day prior to the procedure as directed. The patient is told not to drive, drink alcohol, use recreational drugs, exercise, or work the day of the procedure. The patient was told of the need for an escort to accompany the patient home after the procedure. The patient is aware that the procedure may be cancelled if they fail to follow the directions. The patient agreed and will schedule for the procedure. The patient can take the antihypertensive medication with a sip of water one hour prior to the procedure. The patient is to hold the diabetic medication the day before and the morning of the procedure. The patient is to hold the blood thinner medication Plavix for 5 days prior to the procedure. The patient is to be n.p.o. after midnight and bowel prep was given. The patient is to return for the procedure after cardiac clearance and interrogation of the pacemaker. The patient is to be n.p.o. after midnight and bowel prep was given. The patient is to return for the procedure.  Upper Endoscopy: I recommend an upper endoscopy to assess for peptic ulcer disease versus esophagitis. The patient was told of the risks and benefits of the procedure. The patient was told of the risks of perforation, emergency surgery, bleeding, infections and missed lesions. The patient is told not to drive, drink alcohol, use recreational drugs, exercise, or work the day of the procedure. The patient was told of the need for an escort to accompany the patient home after the procedure. The patient is aware that the procedure may be cancelled if they fail to follow the directions. The patient agreed and will schedule for the procedure. The patient can take the antihypertensive medication with a sip of water one hour prior to the procedure. The patient is to hold the diabetic medication the day before and the morning of the procedure. The patient is to hold the blood thinner medication Plavix for 5 days prior to the procedure. The patient is to be n.p.o. after midnight and bowel prep was given. The patient is to return for the procedure after cardiac clearance and interrogation of the pacemaker. The patient is to be n.p.o. after midnight. The patient is to return for the procedure.  Prior Endoscopic Procedures: The patient had a prior upper endoscopy and colonoscopy performed by another gastroenterologist. I will try to obtain the prior upper endoscopy and colonoscopy reports. The patient is to sign a record release to obtain those records.  Follow-up: The patient is to follow-up in the office in 4 weeks to reassess the symptoms. The patient was told to call the office if any further problems.            ?  Plan  GERD without esophagitis  Start: Famotidine 40 MG Oral Tablet; Take 1 tablet twice daily  Upper GI Endoscopy; Status:Active; Requested for:05Jun2024;  History of colonic polyps  Start: PEG-3350/Electrolytes 236 GM Oral Solution Reconstituted; MIX AS DIRECTED  AND DRINK OVER 4 HOURS, START AT 4PM  Colonoscopy Screening; Status:Hold For - Scheduling; Requested for:05Jun2024;

## 2024-10-08 NOTE — ASU DISCHARGE PLAN (ADULT/PEDIATRIC) - NS MD DC FALL RISK RISK
For information on Fall & Injury Prevention, visit: [email or web address] OR  [email or web address] OR  https://www.cdc.gov/steadi/patient.html

## 2024-10-08 NOTE — PACU DISCHARGE NOTE - NS MD DISCHARGE NOTE DISCHARGE
Patient states her  was diagnosed with influenza B and was told that he is very contagious.  Patient states she has not had flu vaccine and is asking about getting the vaccine now.  Please advise   Home

## 2024-10-15 LAB — SURGICAL PATHOLOGY STUDY: SIGNIFICANT CHANGE UP

## 2024-10-18 RX ORDER — DULAGLUTIDE 1.5 MG/.5ML
1.5 INJECTION, SOLUTION SUBCUTANEOUS
Qty: 1 | Refills: 0 | Status: ACTIVE | COMMUNITY
Start: 2024-10-18 | End: 1900-01-01

## 2024-11-15 ENCOUNTER — APPOINTMENT (OUTPATIENT)
Dept: GASTROENTEROLOGY | Facility: CLINIC | Age: 64
End: 2024-11-15
Payer: MEDICARE

## 2024-11-15 VITALS
BODY MASS INDEX: 32.2 KG/M2 | WEIGHT: 175 LBS | OXYGEN SATURATION: 98 % | SYSTOLIC BLOOD PRESSURE: 143 MMHG | DIASTOLIC BLOOD PRESSURE: 86 MMHG | TEMPERATURE: 97 F | HEART RATE: 69 BPM | HEIGHT: 62 IN

## 2024-11-15 DIAGNOSIS — K29.80 DUODENITIS W/OUT BLEEDING: ICD-10-CM

## 2024-11-15 DIAGNOSIS — K64.8 OTHER HEMORRHOIDS: ICD-10-CM

## 2024-11-15 DIAGNOSIS — Z86.0100 PERSONAL HISTORY OF COLON POLYPS, UNSPECIFIED: ICD-10-CM

## 2024-11-15 DIAGNOSIS — K63.5 POLYP OF COLON: ICD-10-CM

## 2024-11-15 DIAGNOSIS — R10.31 RIGHT LOWER QUADRANT PAIN: ICD-10-CM

## 2024-11-15 DIAGNOSIS — K62.5 HEMORRHAGE OF ANUS AND RECTUM: ICD-10-CM

## 2024-11-15 DIAGNOSIS — K29.30 CHRONIC SUPERFICIAL GASTRITIS W/OUT BLEEDING: ICD-10-CM

## 2024-11-15 PROBLEM — I10 ESSENTIAL (PRIMARY) HYPERTENSION: Chronic | Status: ACTIVE | Noted: 2024-10-07

## 2024-11-15 PROBLEM — E78.5 HYPERLIPIDEMIA, UNSPECIFIED: Chronic | Status: ACTIVE | Noted: 2024-10-07

## 2024-11-15 PROCEDURE — 99214 OFFICE O/P EST MOD 30 MIN: CPT

## 2024-11-15 RX ORDER — LORAZEPAM 1 MG/1
1 TABLET ORAL
Refills: 0 | Status: ACTIVE | COMMUNITY

## 2024-11-15 RX ORDER — PANTOPRAZOLE 40 MG/1
40 TABLET, DELAYED RELEASE ORAL DAILY
Qty: 30 | Refills: 3 | Status: ACTIVE | COMMUNITY
Start: 2024-11-15 | End: 1900-01-01

## 2024-11-15 RX ORDER — HYDROCORTISONE 25 MG/G
2.5 CREAM TOPICAL 3 TIMES DAILY
Qty: 2 | Refills: 3 | Status: ACTIVE | COMMUNITY
Start: 2024-11-15 | End: 1900-01-01

## 2024-11-15 RX ORDER — SIMETHICONE 125 MG/1
125 TABLET, CHEWABLE ORAL
Qty: 120 | Refills: 3 | Status: ACTIVE | COMMUNITY
Start: 2024-11-15 | End: 1900-01-01

## 2025-01-13 ENCOUNTER — NON-APPOINTMENT (OUTPATIENT)
Age: 65
End: 2025-01-13

## 2025-01-13 ENCOUNTER — APPOINTMENT (OUTPATIENT)
Dept: HEART AND VASCULAR | Facility: CLINIC | Age: 65
End: 2025-01-13
Payer: MEDICARE

## 2025-01-13 VITALS
OXYGEN SATURATION: 97 % | DIASTOLIC BLOOD PRESSURE: 78 MMHG | SYSTOLIC BLOOD PRESSURE: 136 MMHG | TEMPERATURE: 97 F | HEART RATE: 64 BPM

## 2025-01-13 DIAGNOSIS — R94.31 ABNORMAL ELECTROCARDIOGRAM [ECG] [EKG]: ICD-10-CM

## 2025-01-13 DIAGNOSIS — Z98.61 ATHEROSCLEROTIC HEART DISEASE OF NATIVE CORONARY ARTERY W/OUT ANGINA PECTORIS: ICD-10-CM

## 2025-01-13 DIAGNOSIS — I25.10 ATHEROSCLEROTIC HEART DISEASE OF NATIVE CORONARY ARTERY W/OUT ANGINA PECTORIS: ICD-10-CM

## 2025-01-13 DIAGNOSIS — I10 ESSENTIAL (PRIMARY) HYPERTENSION: ICD-10-CM

## 2025-01-13 DIAGNOSIS — E78.5 HYPERLIPIDEMIA, UNSPECIFIED: ICD-10-CM

## 2025-01-13 DIAGNOSIS — I35.1 NONRHEUMATIC AORTIC (VALVE) INSUFFICIENCY: ICD-10-CM

## 2025-01-13 PROCEDURE — 93306 TTE W/DOPPLER COMPLETE: CPT

## 2025-01-13 PROCEDURE — 93000 ELECTROCARDIOGRAM COMPLETE: CPT

## 2025-01-13 PROCEDURE — 99214 OFFICE O/P EST MOD 30 MIN: CPT | Mod: 25

## 2025-01-13 RX ORDER — EZETIMIBE 10 MG/1
10 TABLET ORAL
Qty: 1 | Refills: 1 | Status: ACTIVE | COMMUNITY
Start: 2025-01-13 | End: 1900-01-01

## 2025-01-15 ENCOUNTER — APPOINTMENT (OUTPATIENT)
Dept: PULMONOLOGY | Facility: CLINIC | Age: 65
End: 2025-01-15
Payer: MEDICARE

## 2025-01-15 VITALS
OXYGEN SATURATION: 98 % | BODY MASS INDEX: 31.28 KG/M2 | HEIGHT: 62 IN | SYSTOLIC BLOOD PRESSURE: 129 MMHG | HEART RATE: 60 BPM | TEMPERATURE: 94 F | WEIGHT: 170 LBS | DIASTOLIC BLOOD PRESSURE: 80 MMHG

## 2025-01-15 DIAGNOSIS — G47.33 OBSTRUCTIVE SLEEP APNEA (ADULT) (PEDIATRIC): ICD-10-CM

## 2025-01-15 DIAGNOSIS — R05.9 COUGH, UNSPECIFIED: ICD-10-CM

## 2025-01-15 PROCEDURE — G0008: CPT

## 2025-01-15 PROCEDURE — 99213 OFFICE O/P EST LOW 20 MIN: CPT | Mod: 25

## 2025-01-15 PROCEDURE — 90656 IIV3 VACC NO PRSV 0.5 ML IM: CPT

## 2025-01-31 ENCOUNTER — APPOINTMENT (OUTPATIENT)
Dept: INTERNAL MEDICINE | Facility: CLINIC | Age: 65
End: 2025-01-31
Payer: MEDICARE

## 2025-01-31 VITALS
HEART RATE: 59 BPM | WEIGHT: 170 LBS | OXYGEN SATURATION: 98 % | SYSTOLIC BLOOD PRESSURE: 115 MMHG | BODY MASS INDEX: 31.09 KG/M2 | DIASTOLIC BLOOD PRESSURE: 77 MMHG

## 2025-01-31 DIAGNOSIS — I25.10 ATHEROSCLEROTIC HEART DISEASE OF NATIVE CORONARY ARTERY W/OUT ANGINA PECTORIS: ICD-10-CM

## 2025-01-31 DIAGNOSIS — I10 ESSENTIAL (PRIMARY) HYPERTENSION: ICD-10-CM

## 2025-01-31 DIAGNOSIS — E11.9 TYPE 2 DIABETES MELLITUS W/OUT COMPLICATIONS: ICD-10-CM

## 2025-01-31 PROCEDURE — 99213 OFFICE O/P EST LOW 20 MIN: CPT | Mod: 25

## 2025-01-31 PROCEDURE — 90471 IMMUNIZATION ADMIN: CPT

## 2025-01-31 PROCEDURE — G0444 DEPRESSION SCREEN ANNUAL: CPT | Mod: 59

## 2025-01-31 PROCEDURE — 90678 RSV VACC PREF BIVALENT IM: CPT

## 2025-02-03 LAB
ALBUMIN SERPL ELPH-MCNC: 4.5 G/DL
ALP BLD-CCNC: 77 U/L
ALT SERPL-CCNC: 34 U/L
ANION GAP SERPL CALC-SCNC: 13 MMOL/L
AST SERPL-CCNC: 23 U/L
BILIRUB SERPL-MCNC: 0.5 MG/DL
BUN SERPL-MCNC: 18 MG/DL
CALCIUM SERPL-MCNC: 9.9 MG/DL
CHLORIDE SERPL-SCNC: 104 MMOL/L
CHOLEST SERPL-MCNC: 120 MG/DL
CO2 SERPL-SCNC: 24 MMOL/L
CREAT SERPL-MCNC: 0.93 MG/DL
EGFR: 92 ML/MIN/1.73M2
ESTIMATED AVERAGE GLUCOSE: 137 MG/DL
GLUCOSE SERPL-MCNC: 149 MG/DL
HBA1C MFR BLD HPLC: 6.4 %
HDLC SERPL-MCNC: 34 MG/DL
LDLC SERPL CALC-MCNC: 63 MG/DL
NONHDLC SERPL-MCNC: 86 MG/DL
POTASSIUM SERPL-SCNC: 4.8 MMOL/L
PROT SERPL-MCNC: 7.1 G/DL
SODIUM SERPL-SCNC: 140 MMOL/L
TRIGL SERPL-MCNC: 125 MG/DL

## 2025-02-04 ENCOUNTER — APPOINTMENT (OUTPATIENT)
Dept: SLEEP CENTER | Facility: HOME HEALTH | Age: 65
End: 2025-02-04

## 2025-02-04 ENCOUNTER — OUTPATIENT (OUTPATIENT)
Dept: OUTPATIENT SERVICES | Facility: HOSPITAL | Age: 65
LOS: 1 days | End: 2025-02-04
Payer: MEDICARE

## 2025-02-04 DIAGNOSIS — Z95.0 PRESENCE OF CARDIAC PACEMAKER: Chronic | ICD-10-CM

## 2025-02-04 DIAGNOSIS — Z95.5 PRESENCE OF CORONARY ANGIOPLASTY IMPLANT AND GRAFT: Chronic | ICD-10-CM

## 2025-02-04 PROCEDURE — 95800 SLP STDY UNATTENDED: CPT | Mod: 26

## 2025-02-04 PROCEDURE — 95800 SLP STDY UNATTENDED: CPT

## 2025-02-06 DIAGNOSIS — G47.33 OBSTRUCTIVE SLEEP APNEA (ADULT) (PEDIATRIC): ICD-10-CM

## 2025-02-18 ENCOUNTER — APPOINTMENT (OUTPATIENT)
Dept: ORTHOPEDIC SURGERY | Facility: CLINIC | Age: 65
End: 2025-02-18

## 2025-02-26 ENCOUNTER — APPOINTMENT (OUTPATIENT)
Dept: PULMONOLOGY | Facility: CLINIC | Age: 65
End: 2025-02-26
Payer: MEDICARE

## 2025-02-26 PROCEDURE — 99212 OFFICE O/P EST SF 10 MIN: CPT | Mod: 93

## 2025-02-28 ENCOUNTER — RX RENEWAL (OUTPATIENT)
Age: 65
End: 2025-02-28

## 2025-04-08 ENCOUNTER — NON-APPOINTMENT (OUTPATIENT)
Age: 65
End: 2025-04-08

## 2025-04-16 ENCOUNTER — APPOINTMENT (OUTPATIENT)
Dept: PULMONOLOGY | Facility: CLINIC | Age: 65
End: 2025-04-16

## 2025-05-01 RX ORDER — LORAZEPAM 1 MG/1
1 TABLET ORAL DAILY
Qty: 10 | Refills: 0 | Status: ACTIVE | COMMUNITY
Start: 2025-05-01 | End: 1900-01-01

## 2025-05-05 ENCOUNTER — APPOINTMENT (OUTPATIENT)
Dept: HEART AND VASCULAR | Facility: CLINIC | Age: 65
End: 2025-05-05
Payer: MEDICARE

## 2025-05-05 ENCOUNTER — NON-APPOINTMENT (OUTPATIENT)
Age: 65
End: 2025-05-05

## 2025-05-05 VITALS
HEIGHT: 62 IN | SYSTOLIC BLOOD PRESSURE: 124 MMHG | WEIGHT: 170 LBS | OXYGEN SATURATION: 98 % | TEMPERATURE: 97.4 F | HEART RATE: 71 BPM | BODY MASS INDEX: 31.28 KG/M2 | DIASTOLIC BLOOD PRESSURE: 82 MMHG

## 2025-05-05 DIAGNOSIS — E78.5 HYPERLIPIDEMIA, UNSPECIFIED: ICD-10-CM

## 2025-05-05 DIAGNOSIS — I25.10 ATHEROSCLEROTIC HEART DISEASE OF NATIVE CORONARY ARTERY W/OUT ANGINA PECTORIS: ICD-10-CM

## 2025-05-05 DIAGNOSIS — E78.6 LIPOPROTEIN DEFICIENCY: ICD-10-CM

## 2025-05-05 DIAGNOSIS — Z98.61 ATHEROSCLEROTIC HEART DISEASE OF NATIVE CORONARY ARTERY W/OUT ANGINA PECTORIS: ICD-10-CM

## 2025-05-05 DIAGNOSIS — I25.5 ISCHEMIC CARDIOMYOPATHY: ICD-10-CM

## 2025-05-05 DIAGNOSIS — I10 ESSENTIAL (PRIMARY) HYPERTENSION: ICD-10-CM

## 2025-05-05 PROCEDURE — 99214 OFFICE O/P EST MOD 30 MIN: CPT | Mod: 25

## 2025-05-05 PROCEDURE — 93000 ELECTROCARDIOGRAM COMPLETE: CPT

## 2025-05-16 ENCOUNTER — APPOINTMENT (OUTPATIENT)
Dept: PULMONOLOGY | Facility: CLINIC | Age: 65
End: 2025-05-16

## 2025-06-06 ENCOUNTER — APPOINTMENT (OUTPATIENT)
Dept: PULMONOLOGY | Facility: CLINIC | Age: 65
End: 2025-06-06

## 2025-07-07 ENCOUNTER — APPOINTMENT (OUTPATIENT)
Dept: INTERNAL MEDICINE | Facility: CLINIC | Age: 65
End: 2025-07-07
Payer: MEDICARE

## 2025-07-07 VITALS
OXYGEN SATURATION: 97 % | HEART RATE: 88 BPM | WEIGHT: 171 LBS | DIASTOLIC BLOOD PRESSURE: 71 MMHG | BODY MASS INDEX: 31.47 KG/M2 | SYSTOLIC BLOOD PRESSURE: 104 MMHG | HEIGHT: 62 IN

## 2025-07-07 PROCEDURE — 83036 HEMOGLOBIN GLYCOSYLATED A1C: CPT | Mod: QW

## 2025-07-07 PROCEDURE — 99213 OFFICE O/P EST LOW 20 MIN: CPT | Mod: 25

## 2025-07-07 RX ORDER — PANTOPRAZOLE 40 MG/1
40 TABLET, DELAYED RELEASE ORAL
Qty: 30 | Refills: 2 | Status: ACTIVE | COMMUNITY
Start: 2025-07-07 | End: 1900-01-01

## 2025-09-04 ENCOUNTER — APPOINTMENT (OUTPATIENT)
Dept: HEART AND VASCULAR | Facility: CLINIC | Age: 65
End: 2025-09-04
Payer: MEDICARE

## 2025-09-04 ENCOUNTER — NON-APPOINTMENT (OUTPATIENT)
Age: 65
End: 2025-09-04

## 2025-09-04 VITALS
HEIGHT: 62 IN | WEIGHT: 174 LBS | OXYGEN SATURATION: 98 % | DIASTOLIC BLOOD PRESSURE: 84 MMHG | HEART RATE: 82 BPM | BODY MASS INDEX: 32.02 KG/M2 | SYSTOLIC BLOOD PRESSURE: 120 MMHG | TEMPERATURE: 97.6 F

## 2025-09-04 DIAGNOSIS — E78.6 LIPOPROTEIN DEFICIENCY: ICD-10-CM

## 2025-09-04 DIAGNOSIS — I25.10 ATHEROSCLEROTIC HEART DISEASE OF NATIVE CORONARY ARTERY W/OUT ANGINA PECTORIS: ICD-10-CM

## 2025-09-04 DIAGNOSIS — Z98.61 ATHEROSCLEROTIC HEART DISEASE OF NATIVE CORONARY ARTERY W/OUT ANGINA PECTORIS: ICD-10-CM

## 2025-09-04 DIAGNOSIS — E78.5 HYPERLIPIDEMIA, UNSPECIFIED: ICD-10-CM

## 2025-09-04 DIAGNOSIS — I10 ESSENTIAL (PRIMARY) HYPERTENSION: ICD-10-CM

## 2025-09-04 PROCEDURE — 99214 OFFICE O/P EST MOD 30 MIN: CPT | Mod: 25

## 2025-09-04 PROCEDURE — 93000 ELECTROCARDIOGRAM COMPLETE: CPT

## 2025-09-04 PROCEDURE — 36415 COLL VENOUS BLD VENIPUNCTURE: CPT

## 2025-09-05 LAB
ALBUMIN SERPL ELPH-MCNC: 4.6 G/DL
ALP BLD-CCNC: 82 U/L
ALT SERPL-CCNC: 45 U/L
ANION GAP SERPL CALC-SCNC: 13 MMOL/L
AST SERPL-CCNC: 28 U/L
BILIRUB SERPL-MCNC: 0.4 MG/DL
BUN SERPL-MCNC: 16 MG/DL
CALCIUM SERPL-MCNC: 10.1 MG/DL
CHLORIDE SERPL-SCNC: 105 MMOL/L
CHOLEST SERPL-MCNC: 128 MG/DL
CO2 SERPL-SCNC: 22 MMOL/L
CREAT SERPL-MCNC: 0.94 MG/DL
EGFRCR SERPLBLD CKD-EPI 2021: 91 ML/MIN/1.73M2
GLUCOSE SERPL-MCNC: 97 MG/DL
HDLC SERPL-MCNC: 33 MG/DL
LDLC SERPL-MCNC: 74 MG/DL
NONHDLC SERPL-MCNC: 95 MG/DL
POTASSIUM SERPL-SCNC: 4.6 MMOL/L
PROT SERPL-MCNC: 7.3 G/DL
SODIUM SERPL-SCNC: 141 MMOL/L
TRIGL SERPL-MCNC: 116 MG/DL

## 2025-09-05 RX ORDER — EVOLOCUMAB 140 MG/ML
140 INJECTION, SOLUTION SUBCUTANEOUS
Qty: 1 | Refills: 3 | Status: ACTIVE | COMMUNITY
Start: 2025-09-05 | End: 1900-01-01

## (undated) DEVICE — SOL INJ NS 0.9% 500ML 1-PORT

## (undated) DEVICE — STERIS DEFENDO 3-PIECE KIT (AIR/WATER, SUCTION & BIOPSY VALVES)

## (undated) DEVICE — ADAPTER ENDO CHNL SINGLE USE

## (undated) DEVICE — VALVE ENDO SURESEAL II 0-5FR

## (undated) DEVICE — CLAMP BX HOT RAD JAW 3

## (undated) DEVICE — BITE BLOCK ADULT 20 X 27MM (GREEN)

## (undated) DEVICE — TUBING IV SET GRAVITY 3Y 100" MACRO

## (undated) DEVICE — VENODYNE/SCD SLEEVE CALF MEDIUM

## (undated) DEVICE — SOLIDIFIER ISOLYZER 2000 CC

## (undated) DEVICE — RETRIEVER ROTH NET PLATINUM-UNIVERSAL

## (undated) DEVICE — KIT ENDO PROCEDURE CUST W/VLV

## (undated) DEVICE — SENSOR O2 FINGER ADULT

## (undated) DEVICE — SYR LUER LOK 50CC

## (undated) DEVICE — CATH ELCTR GLIDE PRB 7FR

## (undated) DEVICE — LUBRICATING JELLY ONESHOT 1.25OZ

## (undated) DEVICE — MASK O2 MICROSTREAM SAMPLE LINE MED/LRG 10FT

## (undated) DEVICE — DRSG CURITY GAUZE SPONGE 4 X 4" 12-PLY

## (undated) DEVICE — TUBING MEDI-VAC W MAXIGRIP CONNECTORS 1/4"X6'

## (undated) DEVICE — FORCEP RADIAL JAW 4 W NDL 2.2MM 2.8MM 240CM ORANGE DISP

## (undated) DEVICE — FORCEP BIOPSY 2.5MM DISP

## (undated) DEVICE — FORMALIN CUPS 10% BUFFERED

## (undated) DEVICE — NDL INJ SCLERO INTERJECT 23G

## (undated) DEVICE — TUBING CANNULA SALTER LABS NASAL ADULT 7FT

## (undated) DEVICE — SNARE LOOP POLY DISP 30MM LOOP